# Patient Record
Sex: MALE | Race: WHITE | NOT HISPANIC OR LATINO | Employment: PART TIME | URBAN - METROPOLITAN AREA
[De-identification: names, ages, dates, MRNs, and addresses within clinical notes are randomized per-mention and may not be internally consistent; named-entity substitution may affect disease eponyms.]

---

## 2017-03-30 ENCOUNTER — TELEPHONE (OUTPATIENT)
Dept: TRANSPLANT | Facility: CLINIC | Age: 57
End: 2017-03-30

## 2017-03-30 NOTE — TELEPHONE ENCOUNTER
Patient information called and information that his medical records have been received and will be reviewed for appointment.  Patient instructed that some one will call him to set-up an appointment and he must bring his current imaging to his appointment.

## 2017-03-30 NOTE — TELEPHONE ENCOUNTER
Call placed to the referring doctor's office to inform them that we have received the referral.  Updated labs are now being faxed .

## 2017-04-03 DIAGNOSIS — Z76.82 ORGAN TRANSPLANT CANDIDATE: ICD-10-CM

## 2017-04-03 DIAGNOSIS — C22.0 HEPATOCELLULAR CARCINOMA: ICD-10-CM

## 2017-04-04 ENCOUNTER — TELEPHONE (OUTPATIENT)
Dept: TRANSPLANT | Facility: CLINIC | Age: 57
End: 2017-04-04

## 2017-04-04 RX ORDER — ASCORBIC ACID 500 MG
500 TABLET ORAL DAILY
Status: ON HOLD | COMMUNITY
End: 2017-12-10 | Stop reason: HOSPADM

## 2017-04-04 RX ORDER — NADOLOL 40 MG/1
20 TABLET ORAL DAILY
COMMUNITY
End: 2017-06-27 | Stop reason: SINTOL

## 2017-04-04 RX ORDER — FERROUS SULFATE 325(65) MG
325 TABLET ORAL 3 TIMES DAILY
Status: ON HOLD | COMMUNITY
End: 2017-11-29 | Stop reason: HOSPADM

## 2017-04-04 RX ORDER — POLYETHYLENE GLYCOL 3350 17 G/17G
17 POWDER, FOR SOLUTION ORAL DAILY
Status: ON HOLD | COMMUNITY
End: 2017-11-29 | Stop reason: HOSPADM

## 2017-04-04 RX ORDER — OMEPRAZOLE 20 MG/1
20 CAPSULE, DELAYED RELEASE ORAL DAILY
Status: ON HOLD | COMMUNITY
End: 2017-12-10 | Stop reason: HOSPADM

## 2017-04-04 RX ORDER — IBUPROFEN 200 MG
200 TABLET ORAL DAILY
Status: ON HOLD | COMMUNITY
End: 2017-11-29 | Stop reason: HOSPADM

## 2017-04-06 ENCOUNTER — OFFICE VISIT (OUTPATIENT)
Dept: TRANSPLANT | Facility: CLINIC | Age: 57
End: 2017-04-06
Payer: MEDICAID

## 2017-04-06 ENCOUNTER — LAB VISIT (OUTPATIENT)
Dept: LAB | Facility: HOSPITAL | Age: 57
End: 2017-04-06
Attending: INTERNAL MEDICINE
Payer: MEDICAID

## 2017-04-06 ENCOUNTER — TELEPHONE (OUTPATIENT)
Dept: TRANSPLANT | Facility: CLINIC | Age: 57
End: 2017-04-06

## 2017-04-06 VITALS
OXYGEN SATURATION: 99 % | DIASTOLIC BLOOD PRESSURE: 78 MMHG | BODY MASS INDEX: 27.11 KG/M2 | HEIGHT: 70 IN | SYSTOLIC BLOOD PRESSURE: 141 MMHG | WEIGHT: 189.38 LBS | RESPIRATION RATE: 16 BRPM | TEMPERATURE: 98 F | HEART RATE: 50 BPM

## 2017-04-06 DIAGNOSIS — Z76.82 ORGAN TRANSPLANT CANDIDATE: ICD-10-CM

## 2017-04-06 DIAGNOSIS — R18.8 OTHER ASCITES: Primary | ICD-10-CM

## 2017-04-06 DIAGNOSIS — C22.0 HEPATOCELLULAR CARCINOMA: ICD-10-CM

## 2017-04-06 LAB
ABO + RH BLD: NORMAL
AFP SERPL-MCNC: 30 NG/ML
ALBUMIN SERPL BCP-MCNC: 3.3 G/DL
ALP SERPL-CCNC: 117 U/L
ALT SERPL W/O P-5'-P-CCNC: 126 U/L
AMPHET+METHAMPHET UR QL: NEGATIVE
ANION GAP SERPL CALC-SCNC: 5 MMOL/L
AST SERPL-CCNC: 124 U/L
BARBITURATES UR QL SCN>200 NG/ML: NEGATIVE
BASOPHILS # BLD AUTO: 0.02 K/UL
BASOPHILS NFR BLD: 0.6 %
BENZODIAZ UR QL SCN>200 NG/ML: NEGATIVE
BILIRUB DIRECT SERPL-MCNC: 0.7 MG/DL
BILIRUB SERPL-MCNC: 1.6 MG/DL
BILIRUB UR QL STRIP: NEGATIVE
BLD GP AB SCN CELLS X3 SERPL QL: NORMAL
BUN SERPL-MCNC: 15 MG/DL
BZE UR QL SCN: NEGATIVE
CALCIUM SERPL-MCNC: 9 MG/DL
CANNABINOIDS UR QL SCN: NEGATIVE
CHLORIDE SERPL-SCNC: 109 MMOL/L
CHOLEST/HDLC SERPL: 2.7 {RATIO}
CLARITY UR REFRACT.AUTO: ABNORMAL
CO2 SERPL-SCNC: 29 MMOL/L
COLOR UR AUTO: ABNORMAL
CREAT SERPL-MCNC: 0.9 MG/DL
CREAT UR-MCNC: 119 MG/DL
DIFFERENTIAL METHOD: ABNORMAL
EOSINOPHIL # BLD AUTO: 0.2 K/UL
EOSINOPHIL NFR BLD: 5.6 %
ERYTHROCYTE [DISTWIDTH] IN BLOOD BY AUTOMATED COUNT: 15.4 %
EST. GFR  (AFRICAN AMERICAN): >60 ML/MIN/1.73 M^2
EST. GFR  (NON AFRICAN AMERICAN): >60 ML/MIN/1.73 M^2
ETHANOL UR-MCNC: <10 MG/DL
GGT SERPL-CCNC: 125 U/L
GLUCOSE SERPL-MCNC: 125 MG/DL
GLUCOSE UR QL STRIP: NEGATIVE
HCT VFR BLD AUTO: 36.2 %
HDL/CHOLESTEROL RATIO: 37.4 %
HDLC SERPL-MCNC: 139 MG/DL
HDLC SERPL-MCNC: 52 MG/DL
HGB BLD-MCNC: 12 G/DL
HGB UR QL STRIP: NEGATIVE
INR PPP: 1.1
KETONES UR QL STRIP: NEGATIVE
LDLC SERPL CALC-MCNC: 80 MG/DL
LEUKOCYTE ESTERASE UR QL STRIP: NEGATIVE
LYMPHOCYTES # BLD AUTO: 0.9 K/UL
LYMPHOCYTES NFR BLD: 26 %
MCH RBC QN AUTO: 29.2 PG
MCHC RBC AUTO-ENTMCNC: 33.1 %
MCV RBC AUTO: 88 FL
METHADONE UR QL SCN>300 NG/ML: NEGATIVE
MICROSCOPIC COMMENT: NORMAL
MONOCYTES # BLD AUTO: 0.5 K/UL
MONOCYTES NFR BLD: 13.3 %
NEUTROPHILS # BLD AUTO: 1.9 K/UL
NEUTROPHILS NFR BLD: 54.5 %
NITRITE UR QL STRIP: NEGATIVE
NONHDLC SERPL-MCNC: 87 MG/DL
OPIATES UR QL SCN: NEGATIVE
PCP UR QL SCN>25 NG/ML: NEGATIVE
PH UR STRIP: 6 [PH] (ref 5–8)
PLATELET # BLD AUTO: 110 K/UL
PMV BLD AUTO: 11.1 FL
POTASSIUM SERPL-SCNC: 4 MMOL/L
PROT SERPL-MCNC: 7.5 G/DL
PROT UR QL STRIP: NEGATIVE
PROTHROMBIN TIME: 11.9 SEC
RBC # BLD AUTO: 4.11 M/UL
SODIUM SERPL-SCNC: 143 MMOL/L
SP GR UR STRIP: 1.02 (ref 1–1.03)
TOXICOLOGY INFORMATION: NORMAL
TRIGL SERPL-MCNC: 35 MG/DL
URN SPEC COLLECT METH UR: ABNORMAL
UROBILINOGEN UR STRIP-ACNC: NEGATIVE EU/DL
WBC # BLD AUTO: 3.39 K/UL

## 2017-04-06 PROCEDURE — 99999 PR PBB SHADOW E&M-EST. PATIENT-LVL IV: CPT | Mod: PBBFAC,TXP,, | Performed by: INTERNAL MEDICINE

## 2017-04-06 PROCEDURE — 80307 DRUG TEST PRSMV CHEM ANLYZR: CPT

## 2017-04-06 PROCEDURE — 81001 URINALYSIS AUTO W/SCOPE: CPT | Mod: NTX

## 2017-04-06 PROCEDURE — 99205 OFFICE O/P NEW HI 60 MIN: CPT | Mod: S$PBB,,, | Performed by: INTERNAL MEDICINE

## 2017-04-06 PROCEDURE — 99214 OFFICE O/P EST MOD 30 MIN: CPT | Mod: PBBFAC,NTX | Performed by: INTERNAL MEDICINE

## 2017-04-06 RX ORDER — FUROSEMIDE 20 MG/1
20 TABLET ORAL DAILY
Qty: 30 TABLET | Refills: 11 | Status: ON HOLD | OUTPATIENT
Start: 2017-04-06 | End: 2017-11-29 | Stop reason: HOSPADM

## 2017-04-06 RX ORDER — SPIRONOLACTONE 50 MG/1
50 TABLET, FILM COATED ORAL DAILY
Qty: 30 TABLET | Refills: 11 | Status: ON HOLD | OUTPATIENT
Start: 2017-04-06 | End: 2017-11-29 | Stop reason: HOSPADM

## 2017-04-06 NOTE — LETTER
April 7, 2017        Benji Moyer  [address] [facility]  [address]  [facility]  [place] [address]  Phone: [phone]  Fax: [phone]             [facility]  [address]  [facility] [address]  Phone: [phone]   Patient: Martin Houston   MR Number: [id number]   YOB: 1960   Date of Visit: 4/6/2017       Dear Dr. Benji Moyer    Thank you for referring Martin Houston to me for evaluation. Attached you will find relevant portions of my assessment and plan of care.    If you have questions, please do not hesitate to call me. I look forward to following Martin Houston along with you.    Sincerely,    Sigrid Nolan MD    Enclosure    If you would like to receive this communication electronically, please contact [email or web address] or [phone] to request [facility] access.    [facility] is a tool which provides read-only access to select patient information with whom you have a relationship. Its easy to use and provides real time access to review your patients record including encounter summaries, notes, results, and demographic information.    If you feel you have received this communication in error or would no longer like to receive these types of communications, please e-mail [email or web address]

## 2017-04-06 NOTE — MR AVS SNAPSHOT
[facility]  [address]  [facility] [address]  Phone: [phone]                  Martin Houston   2017 10:00 AM   Office Visit    Description:  Male : 1960   Provider:  Sigrid Nolan MD   Department:  [facility]           Diagnoses this Visit        Comments    Other ascites    -  Primary     Organ transplant candidate         Hepatocellular carcinoma                To Do List           Goals (5 Years of Data)     None       These Medications        Disp Refills Start End    furosemide (LASIX) 20 MG tablet 30 tablet 11 2017    Take 1 tablet (20 mg total) by mouth once daily. - Oral    Pharmacy: [facility] [address] [place] [address] Ph #: [phone]       spironolactone (ALDACTONE) 50 MG tablet 30 tablet 11 2017    Take 1 tablet (50 mg total) by mouth once daily. - Oral    Pharmacy: [facility] [address] [place] [address] Ph #: [phone]         [facility] On Call     [facility] On Call Nurse Care Line -  Assistance  Unless otherwise directed by your provider, please contact [facility], our nurse care line that is available for  assistance.     Registered nurses in the [facility] provide: appointment scheduling, clinical advisement, health education, and other advisory services.  Call: [phone] (toll free)               Medications           Message regarding Medications     Verify the changes and/or additions to your medication regime listed below are the same as discussed with your clinician today.  If any of these changes or additions are incorrect, please notify your healthcare provider.        START taking these NEW medications        Refills    furosemide (LASIX) 20 MG tablet 11    Sig: Take 1 tablet (20 mg total) by mouth once daily.    Class: Normal    Route: Oral    spironolactone (ALDACTONE) 50 MG tablet 11    Sig: Take 1  "tablet (50 mg total) by mouth once daily.    Class: Normal    Route: Oral           Verify that the below list of medications is an accurate representation of the medications you are currently taking.  If none reported, the list may be blank. If incorrect, please contact your healthcare provider. Carry this list with you in case of emergency.           Current Medications     ascorbic acid, vitamin C, (VITAMIN C) 500 MG tablet Take 500 mg by mouth once daily.    ferrous sulfate 325 mg (65 mg iron) Tab tablet Take 325 mg by mouth 2 (two) times daily.    ibuprofen (ADVIL,MOTRIN) 200 MG tablet Take 200 mg by mouth once daily.    multivitamin-zinc gluconate (SOURCECF PED VITAMIN WITH ZINC) 5 mg/mL Drop Take by mouth.    nadolol (CORGARD) 40 MG tablet Take 40 mg by mouth once daily.    omeprazole (PRILOSEC) 20 MG capsule Take 20 mg by mouth once daily.    polyethylene glycol (GLYCOLAX) 17 gram PwPk Take 17 g by mouth once daily.    furosemide (LASIX) 20 MG tablet Take 1 tablet (20 mg total) by mouth once daily.    spironolactone (ALDACTONE) 50 MG tablet Take 1 tablet (50 mg total) by mouth once daily.           Clinical Reference Information           Your Vitals Were     BP Pulse Temp Resp Height Weight    141/78 (BP Location: Right arm, Patient Position: Sitting, BP Method: Automatic) 50 97.7 °F (36.5 °C) (Oral) 16 5' 10" (1.778 m) 85.9 kg (189 lb 6 oz)    SpO2 BMI             99% 27.17 kg/m2         Blood Pressure          Most Recent Value    BP  (!)  141/78      Allergies as of 4/6/2017     No Known Allergies      Immunizations Administered on Date of Encounter - 4/6/2017     None      Orders Placed During Today's Visit      Normal Orders This Visit    Toxicology screen, urine     Urinalysis       Maintenance Dialysis History     Patient has no recorded history of maintenance dialysis.      Transplant Information        Txp Date Organ Coordinator Care Team     Liver Rosangela Funes Referring Physician:  Benji LYNCH" MD [facility]   Corresponding Physician:  Benji Moyer MD         [facility] Sign-Up     Activating your [facility] account is as easy as 1-2-3!     1) Visit [email or web address], select Sign Up Now, enter this activation code and your date of birth, then select Next.  [email or web address]  Expires: 5/21/2017 10:55 AM      2) Create a username and password to use when you visit [facility] in the future and select a security question in case you lose your password and select Next.    3) Enter your e-mail address and click Sign Up!    Additional Information  If you have questions, please e-mail [email or web address] or call [phone] to talk to our [facility] staff. Remember, [facility] is NOT to be used for urgent needs. For medical emergencies, dial 911.         Instructions    You have liver cancer.  We will discuss in IR conference next Tuesday.    We will determine appropriate treatment based on the conference discussion.      We will start fluid pills for management of fluid.      It is important to monitor your salt intake.  I will provide handout.    Return to clinic with surveillance imaging for same day.         Smoking Cessation     If you would like to quit smoking:   You may be eligible for free services if you are a [place] resident and started smoking cigarettes before September 1, 1988.  Call the Smoking Cessation Trust ([facility]) toll free at (543) 520-2694 or (581) [phone].   Call 1-800-QUIT-NOW if you do not meet the above criteria.   Contact us via email: [email or web address]   View our website for more information: [email or web address]        Language Assistance Services     ATTENTION: Language assistance services are available, free of charge. Please call [phone].      ATENCIÓN: Si habla español, tiene a chamorro disposición servicios gratuitos de asistencia lingüística. Llame al [phone].     CHÚ Ý: N?u b?n nói Ti?ng Vi?t, có các d?ch v? h? tr? ngôn ng? mi?n phí dành cho b?n. G?i s?  [phone].         [facility] complies with applicable Federal civil rights laws and does not discriminate on the basis of race, color, national origin, age, disability, or sex.

## 2017-04-06 NOTE — Clinical Note
I tried to send note to patient's PCP Deana Morillo but I cannot find information in google.  Can we locate this information so that she can also receive correspondence?  Thanks

## 2017-04-06 NOTE — PATIENT INSTRUCTIONS
You have liver cancer.  We will discuss in IR conference next Tuesday.    We will determine appropriate treatment based on the conference discussion.      We will start fluid pills for management of fluid.      It is important to monitor your salt intake.  I will provide handout.    Return to clinic with surveillance imaging for same day.

## 2017-04-06 NOTE — PROGRESS NOTES
Transplant Hepatology  Liver Transplant Recipient Evaluation      Consultation started: 4/6/2017 at 10:02 AM     Original Referring Provider: Benji Moyer  Current Corresponding Physician: Benji MENESES Native Liver Diagnosis: Primary Liver Malignancy: Hepatoma (HCC) and Cirrhosis    Reason for Visit: evaluation for liver transplant     Subjective:     Martin Houston is a 56 y.o. 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  The patient is accompanied by his parents and aunt.    The patient reports being diagnosed with liver disease secondary to hepatitis C approximately fifteen years ago while incarcerated.  At that time he does not report systems of CLD but had routine blood work performed.  Over time he did develop variceal bleeding and required banding along with ascites.  He has had intermittent jaundice.  Denies overt encephalopathy and not currently on lactulose or rifaximin.    The patient is treatment naive for hepatitis C.  He also had heavy alcohol use while in the .  Reports drinking from late teens until his 30s.  He was unable to consume alcohol while incarcerated and continued to abstain after his release based on the knowledge of liver disease.      The patient admitted to hospital in 2/2017 for dyspnea that was felt to be a panic attack.  During evaluation for SOB, imaging performed including abdominal ultrasound, which was concerning for liver mass.  Patient underwent further imaging that revealed new liver mass concerning for HCC.  Biopsy performed that is consistent with well-differentiated HCC.  Dominant lesion is reported as 8cm but also 1.6cm lesion present.  The patient is referred for management and consideration of transplant.      PMH:   Alcoholic and hepatitis C cirrhosis  HCC    PSH:  No abdominal surgeries;  Right knee surgery     FH: no family history of liver disease    SH:  No alcohol consumption, continuing to smoke tobacco, no  illicit drug use     Review of Systems   Constitutional: Positive for activity change and fatigue. Negative for appetite change, chills and unexpected weight change.   HENT: Negative for hearing loss and sore throat.    Eyes: Negative for visual disturbance.   Respiratory: Negative for shortness of breath.    Cardiovascular: Positive for leg swelling. Negative for chest pain.   Gastrointestinal: Positive for abdominal pain. Negative for abdominal distention, blood in stool, nausea and vomiting.   Musculoskeletal: Negative for gait problem.   Skin: Negative for rash.   Neurological: Negative for weakness and headaches.   Hematological: Negative for adenopathy. Does not bruise/bleed easily.   Psychiatric/Behavioral: Positive for decreased concentration. Negative for confusion.       Objective:   Physical Exam   Constitutional: He is oriented to person, place, and time. He appears well-developed and well-nourished. No distress.   HENT:   Head: Normocephalic and atraumatic.   Mouth/Throat: Oropharynx is clear and moist. No oropharyngeal exudate.   Eyes: Conjunctivae are normal. Pupils are equal, round, and reactive to light. No scleral icterus.   Neck: Normal range of motion. Neck supple. No thyromegaly present.   Cardiovascular: Normal rate, regular rhythm and normal heart sounds.  Exam reveals no gallop and no friction rub.    No murmur heard.  Pulmonary/Chest: Effort normal and breath sounds normal. No respiratory distress. He has no wheezes. He has no rales.   Abdominal: Soft. Bowel sounds are normal. He exhibits no distension. There is no tenderness. There is no rebound and no guarding.   Musculoskeletal: Normal range of motion. He exhibits edema (mild to moderate).   Lymphadenopathy:     He has no cervical adenopathy.   Neurological: He is alert and oriented to person, place, and time. No cranial nerve deficit.   Skin: Skin is warm and dry. No erythema.   Psychiatric: He has a normal mood and affect. His behavior  is normal.   Vitals reviewed.    MELD-Na score: 9 at 4/6/2017  8:55 AM  MELD score: 9 at 4/6/2017  8:55 AM  Calculated from:  Serum Creatinine: 0.9 mg/dL (Rounded to 1) at 4/6/2017  8:55 AM  Serum Sodium: 143 mmol/L (Rounded to 137) at 4/6/2017  8:55 AM  Total Bilirubin: 1.6 mg/dL at 4/6/2017  8:55 AM  INR(ratio): 1.1 at 4/6/2017  8:55 AM  Age: 56 years     Lab Results   Component Value Date     (H) 04/06/2017    BUN 15 04/06/2017    CREATININE 0.9 04/06/2017    CALCIUM 9.0 04/06/2017     04/06/2017    K 4.0 04/06/2017     04/06/2017    PROT 7.5 04/06/2017    CO2 29 04/06/2017    WBC 3.39 (L) 04/06/2017    RBC 4.11 (L) 04/06/2017    HGB 12.0 (L) 04/06/2017    HCT 36.2 (L) 04/06/2017     (L) 04/06/2017     Lab Results   Component Value Date    CHOL 139 04/06/2017    TRIG 35 04/06/2017    HDL 52 04/06/2017    CHOLHDL 37.4 04/06/2017    TOTALCHOLEST 2.7 04/06/2017    ALBUMIN 3.3 (L) 04/06/2017    BILITOT 1.6 (H) 04/06/2017     (H) 04/06/2017     (H) 04/06/2017    ALKPHOS 117 04/06/2017    LABPROT 11.9 04/06/2017    INR 1.1 04/06/2017       Diagnostics: external imaging reports reviewed   CT a/p w/ w/o contrast: 8cm mass in right lobe with segment 6 1.6cm nodule (2/24/2017)  Chest CT (contrast PE protocol):  No lesions (1/31/17)  Path: well-differentiated HCC 3/14/2017    1. Organ transplant candidate    2. Hepatocellular carcinoma        Transplant Hepatology - Candidacy   Assessment/Plan:     Transplant Candidacy: Martin Houston is a 56 y.o. male with ESLD secondary to alcohol abuse and hepatitis C here for evaluation for possible OLT.  In my opinion, it is unclear if  he is a good candidate for liver transplant.     The patient's reported tumor burden is 8cm and 1.6cm by external imaging.  This is outside of [place] and will not likely make him a transplant candidate if this sizing is confirmed.  He will be discussed next week in IR conference to determine if there is agreement  with sizing.  He is biopsy-proven HCC.  Determination of treatment options will also be discussed during conference.    HCC:  Discussed that patient most likely will require locoregional therapy    Volume overload:  Initiated on furosemide 20mg and spironolactone 50mg daily for mild peripheral edema which has been worsening over last few weeks.  Not adherent to low sodium diet and patient provided with handouts.  Recommend repeat BMP within 2 weeks locally.      Disability:  Patient had questions regarding ability to work.  Currently feels that he would like to continue work as an  but concern for increasing fatigue and abdominal pain that may limit abilities.  Discussed that patient may request disability based on medical conditions through local SS office.  They may be very appropriate if he is determined to have aggressive cancer that is different to control and without curative options.      RTC in approximately 2 months with surveillance imaging after HCC treatment     Sigrid Nolan MD     dad - [phone] Luis Alberto (may call if you can't reach patient)      [facility] Patient Status  Functional Status: 80% - Normal activity with effort: some symptoms of disease  Physical Capacity: No Limitations    Outside Records Request: none

## 2017-04-07 NOTE — TELEPHONE ENCOUNTER
Submitted for review at liver conference 4/11/17    ----- Message from Deana Ledezma LPN sent at 4/6/2017  4:45 PM CDT -----  Please add to the IR conference on 4/11/17.  CD given to PHILL Aguilar to bring to radiology.  Patient needs to be evaluated for treatment options.

## 2017-04-10 ENCOUNTER — CONFERENCE (OUTPATIENT)
Dept: TRANSPLANT | Facility: CLINIC | Age: 57
End: 2017-04-10
Payer: MEDICAID

## 2017-04-10 DIAGNOSIS — C22.0 HEPATOCELLULAR CARCINOMA: Primary | ICD-10-CM

## 2017-04-10 PROCEDURE — 99999 PR PBB SHADOW E&M-EST. PATIENT-LVL I: CPT | Mod: PBBFAC,TXP,,

## 2017-04-11 ENCOUNTER — TELEPHONE (OUTPATIENT)
Dept: TRANSPLANT | Facility: CLINIC | Age: 57
End: 2017-04-11

## 2017-04-11 NOTE — TELEPHONE ENCOUNTER
Call returned.  Patient states the spoke with someone yesterday in reference to obtaining medical records and his previous providers.      ----- Message from Annie Sherman sent at 4/10/2017 12:48 PM CDT -----  Contact: pt   .Ander Houston is returning call, [phone]

## 2017-04-11 NOTE — TELEPHONE ENCOUNTER
Martin Houston  [id number]    Presenting Provider: Sigrdi Nolan    Presenting Radiologist: Cuauhtemoc Jernigan    Hepatologist: Sigrid Nolan    Indication for review: Tumor staging and Treatment recommendations  Blood Type: O  Diagnosis: Hepatitis C  Listing status: Cleared for consult only     Summary:  56 y.o. 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   Additional Information:    Last IR Review: N/A    Labs:  Lab Results for Liver Discussion Latest Ref Rng & Units 4/6/2017   MELD/PELD - 9   Creatinine 0.5 - 1.4 mg/dL 0.9   Total Bilirubin 0.1 - 1.0 mg/dL 1.6(H)   AST 10 - 40 U/L 124(H)   ALT 10 - 44 U/L 126(H)   Alk Phos 55 - 135 U/L 117   INR 0.8 - 1.2 1.1   Platelets 150 - 350 K/uL 110(L)   AFP 0.0 - 8.4 ng/mL 30(H)   Albumin 3.5 - 5.2 g/dL 3.3(L)   Sodium 136 - 145 mmol/L 143         Original Imaging:  CT 2/24/17 (ext study):  reports an 8 cm mass in the right lobe of the liver which demonstrates foci of arterial enhancement . Portions of the mass demonstrate contrast washout. There is also a segment VI 1.6 cm nodule which demonstrates contrast washout but no arterial enhancement.    US 1/31/17 (ext study):  reported a 6.8cm hyperchoic round mass within the right lobe liver.     Current Imaging:    Interventions:  Bx done 3/10/17 reported well differentiated hepatocellular carcinoma.    [place]:  Date:   Number and size of lesions:  Characteristics:   Nexavar:  no    Discussion:  8 cm mass with infiltrative appearance with questionable extension into vs compression of the  HV.  Lots of areas of washout.  Vague areas of arterial enhancement.  Outside gita.  Indeterminate findings in seg 4.    Plan:  IR for Y90.  Not a transplant candidate.      Patient notified of IR review and recommendations.  Understanding and agreement expressed.   IR consult requested.  Yttrium orders entered.        Note forwarded to  Hepatology clinical staff to coordinate follow-up

## 2017-04-12 ENCOUNTER — TELEPHONE (OUTPATIENT)
Dept: HEPATOLOGY | Facility: CLINIC | Age: 57
End: 2017-04-12

## 2017-04-12 LAB — PHOSPHATIDYLETHANOL (PETH): NEGATIVE NG/ML

## 2017-04-12 NOTE — TELEPHONE ENCOUNTER
Called patient to discuss IR conference results.  Given size and infiltrating appearance of lesion, recommendation for yttrium.  He is not a transplant candidate based on size outside of [place] and [facility] criteria.  I will plan to see him back in Hepatology clinic in approximately 6 weeks which should be scheduled for same day as surveillance imaging following Y-90 treatment.

## 2017-04-17 ENCOUNTER — INITIAL CONSULT (OUTPATIENT)
Dept: INTERVENTIONAL RADIOLOGY/VASCULAR | Facility: CLINIC | Age: 57
End: 2017-04-17
Payer: MEDICAID

## 2017-04-17 VITALS
SYSTOLIC BLOOD PRESSURE: 136 MMHG | WEIGHT: 186 LBS | HEIGHT: 70 IN | HEART RATE: 52 BPM | DIASTOLIC BLOOD PRESSURE: 67 MMHG | BODY MASS INDEX: 26.63 KG/M2

## 2017-04-17 DIAGNOSIS — C22.0 HCC (HEPATOCELLULAR CARCINOMA): Primary | ICD-10-CM

## 2017-04-17 DIAGNOSIS — C22.0 HEPATOCELLULAR CARCINOMA: Primary | ICD-10-CM

## 2017-04-17 PROCEDURE — 99204 OFFICE O/P NEW MOD 45 MIN: CPT | Mod: S$PBB,,, | Performed by: FAMILY MEDICINE

## 2017-04-17 PROCEDURE — 99999 PR PBB SHADOW E&M-EST. PATIENT-LVL III: CPT | Mod: PBBFAC,,,

## 2017-04-17 PROCEDURE — 99213 OFFICE O/P EST LOW 20 MIN: CPT | Mod: PBBFAC

## 2017-04-17 NOTE — PROGRESS NOTES
"Subjective:       Patient ID: Martin Houston is a 56 y.o. male.    Chief Complaint: Cancer    HPI Comments: Patient here to discuss treatment of his hepatocellular carcinoma recently identified in 2/2017 during a work up for shortness of breath. He c/o occasional abdominal pain and points to his lower abdomen when describing his pain. He tells me the pain resolves on its own. He also complains of fatigue and difficulty sleeping. He tells me his appetite has decreased "I lose the taste of it." He is accompanied by his family.    Review of Systems   Constitutional: Positive for activity change, appetite change (x few months ), chills and fatigue. Negative for fever.   HENT: Negative for congestion, drooling, ear discharge, rhinorrhea, sneezing and trouble swallowing.    Eyes: Negative for pain, discharge, redness and itching.        Wears glasses   Respiratory: Positive for shortness of breath (occasional with exertion). Negative for cough, wheezing and stridor.    Cardiovascular: Negative for palpitations and leg swelling.   Gastrointestinal: Positive for abdominal pain, constipation (occasional) and diarrhea (occasional). Negative for abdominal distention, nausea and vomiting.   Genitourinary: Positive for frequency (due to fluid pills). Negative for difficulty urinating, dysuria and urgency.   Musculoskeletal: Positive for myalgias (bilateral in lower extremities). Negative for arthralgias, back pain, gait problem and joint swelling.   Skin: Negative for color change, pallor and rash.   Neurological: Negative for dizziness, weakness and headaches.   Psychiatric/Behavioral: Positive for sleep disturbance (difficulty sleeping).       Objective:      Physical Exam   Constitutional: He is oriented to person, place, and time. He appears well-developed and well-nourished. No distress.   HENT:   Head: Normocephalic and atraumatic.   Right Ear: External ear normal.   Left Ear: External ear normal.   Nose: Nose normal. "   Mouth/Throat: Oropharynx is clear and moist. No oropharyngeal exudate.   Eyes: Conjunctivae are normal. Pupils are equal, round, and reactive to light. Right eye exhibits no discharge. Left eye exhibits no discharge. No scleral icterus.   Neck: Neck supple. No tracheal deviation present. No thyromegaly present.   Cardiovascular: Normal rate, regular rhythm, normal heart sounds and intact distal pulses.  Exam reveals no gallop and no friction rub.    No murmur heard.  Pulmonary/Chest: Effort normal and breath sounds normal. No stridor. No respiratory distress. He has no wheezes. He has no rales.   Abdominal: Soft. Bowel sounds are normal. He exhibits no distension and no mass. There is no hepatosplenomegaly. There is no tenderness. There is no rebound and no guarding.   Musculoskeletal: He exhibits no edema.   Lymphadenopathy:     He has no cervical adenopathy.   Neurological: He is alert and oriented to person, place, and time. Gait normal.   Skin: Skin is warm and dry. He is not diaphoretic. No cyanosis. Nails show no clubbing.   Psychiatric: He has a normal mood and affect.   Vitals reviewed.      Assessment:       1. HCC (hepatocellular carcinoma)        Plan:         Yttrium 90 Radioembolization discussed in detail with the patient including risks, benefits, potential complications, usual pre and post procedure course.  Discussed the need for initial Angiogram mapping study prior to scheduling the actual Radioembolization procedure. Patient and family verbalized understanding and agreement. Consents signed. Patient scheduled for Y90 mapping on 4/26/2017. Pre-procedure handout with clinic phone number provided.

## 2017-04-21 ENCOUNTER — TELEPHONE (OUTPATIENT)
Dept: HEPATOLOGY | Facility: CLINIC | Age: 57
End: 2017-04-21

## 2017-04-25 DIAGNOSIS — C22.0 HEPATOCELLULAR CARCINOMA: Primary | ICD-10-CM

## 2017-04-26 ENCOUNTER — HOSPITAL ENCOUNTER (OUTPATIENT)
Dept: RADIOLOGY | Facility: HOSPITAL | Age: 57
Discharge: HOME | End: 2017-04-26
Attending: INTERNAL MEDICINE | Admitting: INTERNAL MEDICINE
Payer: MEDICAID

## 2017-04-26 ENCOUNTER — HOSPITAL ENCOUNTER (OUTPATIENT)
Facility: HOSPITAL | Age: 57
Discharge: HOME | End: 2017-04-26
Attending: INTERNAL MEDICINE | Admitting: INTERNAL MEDICINE
Payer: MEDICAID

## 2017-04-26 VITALS
DIASTOLIC BLOOD PRESSURE: 53 MMHG | HEART RATE: 43 BPM | WEIGHT: 182 LBS | TEMPERATURE: 98 F | RESPIRATION RATE: 16 BRPM | HEIGHT: 70 IN | SYSTOLIC BLOOD PRESSURE: 109 MMHG | OXYGEN SATURATION: 100 % | BODY MASS INDEX: 26.05 KG/M2

## 2017-04-26 DIAGNOSIS — C22.0 HEPATOCELLULAR CARCINOMA: ICD-10-CM

## 2017-04-26 DIAGNOSIS — C22.0 HCC (HEPATOCELLULAR CARCINOMA): ICD-10-CM

## 2017-04-26 LAB
ALBUMIN SERPL BCP-MCNC: 3.4 G/DL
ALP SERPL-CCNC: 119 U/L
ALT SERPL W/O P-5'-P-CCNC: 180 U/L
ANION GAP SERPL CALC-SCNC: 6 MMOL/L
AST SERPL-CCNC: 164 U/L
BASOPHILS # BLD AUTO: 0.05 K/UL
BASOPHILS NFR BLD: 1 %
BILIRUB DIRECT SERPL-MCNC: 1 MG/DL
BILIRUB SERPL-MCNC: 2.4 MG/DL
BUN SERPL-MCNC: 16 MG/DL
CALCIUM SERPL-MCNC: 9 MG/DL
CHLORIDE SERPL-SCNC: 105 MMOL/L
CO2 SERPL-SCNC: 27 MMOL/L
CREAT SERPL-MCNC: 0.9 MG/DL
DIFFERENTIAL METHOD: ABNORMAL
EOSINOPHIL # BLD AUTO: 0.3 K/UL
EOSINOPHIL NFR BLD: 6.4 %
ERYTHROCYTE [DISTWIDTH] IN BLOOD BY AUTOMATED COUNT: 15.1 %
EST. GFR  (AFRICAN AMERICAN): >60 ML/MIN/1.73 M^2
EST. GFR  (NON AFRICAN AMERICAN): >60 ML/MIN/1.73 M^2
GLUCOSE SERPL-MCNC: 91 MG/DL
HCT VFR BLD AUTO: 40.8 %
HGB BLD-MCNC: 13.3 G/DL
INR PPP: 1.1
LYMPHOCYTES # BLD AUTO: 1.5 K/UL
LYMPHOCYTES NFR BLD: 29.7 %
MCH RBC QN AUTO: 29.4 PG
MCHC RBC AUTO-ENTMCNC: 32.6 %
MCV RBC AUTO: 90 FL
MONOCYTES # BLD AUTO: 0.6 K/UL
MONOCYTES NFR BLD: 11.6 %
NEUTROPHILS # BLD AUTO: 2.6 K/UL
NEUTROPHILS NFR BLD: 51.1 %
PLATELET # BLD AUTO: 122 K/UL
PMV BLD AUTO: 12.1 FL
POTASSIUM SERPL-SCNC: 3.8 MMOL/L
PROT SERPL-MCNC: 7.9 G/DL
PROTHROMBIN TIME: 12 SEC
RBC # BLD AUTO: 4.52 M/UL
SODIUM SERPL-SCNC: 138 MMOL/L
WBC # BLD AUTO: 5.02 K/UL

## 2017-04-26 PROCEDURE — 82248 BILIRUBIN DIRECT: CPT

## 2017-04-26 PROCEDURE — 80053 COMPREHEN METABOLIC PANEL: CPT

## 2017-04-26 PROCEDURE — 63600175 PHARM REV CODE 636 W HCPCS: Performed by: RADIOLOGY

## 2017-04-26 PROCEDURE — 85610 PROTHROMBIN TIME: CPT

## 2017-04-26 PROCEDURE — 85025 COMPLETE CBC W/AUTO DIFF WBC: CPT

## 2017-04-26 PROCEDURE — 25000003 PHARM REV CODE 250: Performed by: NURSE PRACTITIONER

## 2017-04-26 PROCEDURE — 36415 COLL VENOUS BLD VENIPUNCTURE: CPT

## 2017-04-26 PROCEDURE — 78201 LIVER IMAGING STATIC ONLY: CPT | Mod: 26,,, | Performed by: NUCLEAR MEDICINE

## 2017-04-26 PROCEDURE — 25500020 PHARM REV CODE 255: Performed by: INTERNAL MEDICINE

## 2017-04-26 PROCEDURE — 78201 LIVER IMAGING STATIC ONLY: CPT | Mod: TC

## 2017-04-26 RX ORDER — FENTANYL CITRATE 50 UG/ML
INJECTION, SOLUTION INTRAMUSCULAR; INTRAVENOUS CODE/TRAUMA/SEDATION MEDICATION
Status: COMPLETED | OUTPATIENT
Start: 2017-04-26 | End: 2017-04-26

## 2017-04-26 RX ORDER — LIDOCAINE HYDROCHLORIDE 10 MG/ML
1 INJECTION, SOLUTION EPIDURAL; INFILTRATION; INTRACAUDAL; PERINEURAL ONCE AS NEEDED
Status: DISCONTINUED | OUTPATIENT
Start: 2017-04-26 | End: 2017-04-26 | Stop reason: HOSPADM

## 2017-04-26 RX ORDER — MIDAZOLAM HYDROCHLORIDE 1 MG/ML
INJECTION, SOLUTION INTRAMUSCULAR; INTRAVENOUS CODE/TRAUMA/SEDATION MEDICATION
Status: COMPLETED | OUTPATIENT
Start: 2017-04-26 | End: 2017-04-26

## 2017-04-26 RX ORDER — ONDANSETRON 4 MG/1
4 TABLET, FILM COATED ORAL EVERY 6 HOURS PRN
Status: DISCONTINUED | OUTPATIENT
Start: 2017-04-26 | End: 2017-04-26 | Stop reason: HOSPADM

## 2017-04-26 RX ORDER — SODIUM CHLORIDE 9 MG/ML
INJECTION, SOLUTION INTRAVENOUS CONTINUOUS
Status: DISCONTINUED | OUTPATIENT
Start: 2017-04-26 | End: 2017-04-26 | Stop reason: HOSPADM

## 2017-04-26 RX ORDER — OXYCODONE HYDROCHLORIDE 5 MG/1
5 TABLET ORAL EVERY 4 HOURS PRN
Status: DISCONTINUED | OUTPATIENT
Start: 2017-04-26 | End: 2017-04-26 | Stop reason: HOSPADM

## 2017-04-26 RX ADMIN — FENTANYL CITRATE 25 MCG: 50 INJECTION, SOLUTION INTRAMUSCULAR; INTRAVENOUS at 08:04

## 2017-04-26 RX ADMIN — MIDAZOLAM HYDROCHLORIDE 0.5 MG: 1 INJECTION, SOLUTION INTRAMUSCULAR; INTRAVENOUS at 09:04

## 2017-04-26 RX ADMIN — MIDAZOLAM HYDROCHLORIDE 0.5 MG: 1 INJECTION, SOLUTION INTRAMUSCULAR; INTRAVENOUS at 08:04

## 2017-04-26 RX ADMIN — FENTANYL CITRATE 25 MCG: 50 INJECTION, SOLUTION INTRAMUSCULAR; INTRAVENOUS at 09:04

## 2017-04-26 RX ADMIN — AMPICILLIN SODIUM AND SULBACTAM SODIUM 3 G: 2; 1 INJECTION, POWDER, FOR SOLUTION INTRAMUSCULAR; INTRAVENOUS at 06:04

## 2017-04-26 RX ADMIN — IOHEXOL 80 ML: 300 INJECTION, SOLUTION INTRAVENOUS at 09:04

## 2017-04-26 NOTE — PROGRESS NOTES
Patient given discharge instructions and verbalized understanding of at home care. IV discontinued with catheter intact. Bandage to right groin clean, dry and intact. Patient discharged home via wheelchair under care of transport and family members.

## 2017-04-26 NOTE — PROGRESS NOTES
HOB elevated 45 degrees. No bleeding or hematoma noted to right groin site. Will continue to monitor.

## 2017-04-26 NOTE — DISCHARGE SUMMARY
Radiology Discharge Summary      Hospital Course: No complications    Admit Date: 4/26/2017  Discharge Date: 04/26/2017     Instructions Given to Patient: Yes  Diet: Resume prior diet  Activity: activity as tolerated and no driving for today    Description of Condition on Discharge: Stable  Vital Signs (Most Recent): Temp: 97.6 °F (36.4 °C) (04/26/17 0945)  Pulse: (!) 43 (04/26/17 1030)  Resp: 16 (04/26/17 1030)  BP: (!) 107/56 (04/26/17 1030)  SpO2: 100 % (04/26/17 1030)    Discharge Disposition: Home    Discharge Diagnosis: HCC    Michael Magallanes M.D.  Diagnostic and Interventional Radiologist  Department of Radiology  Pager: [phone]

## 2017-04-26 NOTE — SEDATION DOCUMENTATION
Hemostasis achieved via R groin with use of ExoSeal closure device. Patient to lie flat till 11:35.

## 2017-04-26 NOTE — H&P
Radiology History & Physical      SUBJECTIVE:     Chief Complaint: HCC in need of treatment    History of Present Illness:  Martin Houston is a 56 y.o. male who presents for Pre- Y90 mapping  Past Medical History:   Diagnosis Date    Anemia     Bone spur of foot     patient bone spurs removed    Cirrhosis     Gallstones     GERD (gastroesophageal reflux disease)     Hernia     patient reports 2 herina in the groin area    Personal history of kidney stones      Past Surgical History:   Procedure Laterality Date    TONSILLECTOMY         Home Meds:   Prior to Admission medications    Medication Sig Start Date End Date Taking? Authorizing Provider   ascorbic acid, vitamin C, (VITAMIN C) 500 MG tablet Take 500 mg by mouth once daily.    Historical Provider, MD   ferrous sulfate 325 mg (65 mg iron) Tab tablet Take 325 mg by mouth 2 (two) times daily.    Historical Provider, MD   furosemide (LASIX) 20 MG tablet Take 1 tablet (20 mg total) by mouth once daily. 4/6/17 4/6/18  Sigrid Nolan MD   ibuprofen (ADVIL,MOTRIN) 200 MG tablet Take 200 mg by mouth once daily.    Historical Provider, MD   multivitamin-zinc gluconate (SOURCECF PED VITAMIN WITH ZINC) 5 mg/mL Drop Take by mouth.    Historical Provider, MD   nadolol (CORGARD) 40 MG tablet Take 40 mg by mouth once daily.    Historical Provider, MD   omeprazole (PRILOSEC) 20 MG capsule Take 20 mg by mouth once daily.    Historical Provider, MD   polyethylene glycol (GLYCOLAX) 17 gram PwPk Take 17 g by mouth once daily.    Historical Provider, MD   spironolactone (ALDACTONE) 50 MG tablet Take 1 tablet (50 mg total) by mouth once daily. 4/6/17 4/6/18  Sigrid Nolan MD     Anticoagulants/Antiplatelets: no anticoagulation    Allergies: Review of patient's allergies indicates:  No Known Allergies  Sedation History:  no adverse reactions    Review of Systems:   Hematological: no known coagulopathies  Respiratory: no shortness of breath  Cardiovascular: no chest  pain  Gastrointestinal: no abdominal pain  Genito-Urinary: no dysuria  Musculoskeletal: negative  Neurological: no TIA or stroke symptoms         OBJECTIVE:     Vital Signs (Most Recent)       Physical Exam:  ASA: 2  Mallampati: 3    General: no acute distress  Mental Status: alert and oriented to person, place and time  HEENT: normocephalic, atraumatic  Chest: unlabored breathing  Heart: regular heart rate  Abdomen: nondistended  Extremity: moves all extremities    Laboratory  Lab Results   Component Value Date    INR 1.1 04/06/2017       Lab Results   Component Value Date    WBC 3.39 (L) 04/06/2017    HGB 12.0 (L) 04/06/2017    HCT 36.2 (L) 04/06/2017    MCV 88 04/06/2017     (L) 04/06/2017      Lab Results   Component Value Date     (H) 04/06/2017     04/06/2017    K 4.0 04/06/2017     04/06/2017    CO2 29 04/06/2017    BUN 15 04/06/2017    CREATININE 0.9 04/06/2017    CALCIUM 9.0 04/06/2017     (H) 04/06/2017     (H) 04/06/2017    ALBUMIN 3.3 (L) 04/06/2017    BILITOT 1.6 (H) 04/06/2017    BILIDIR 0.7 (H) 04/06/2017       ASSESSMENT/PLAN:     Sedation Plan: moderate  Patient will undergo pre Y90 mapping and flow diversion of any branches feeding bowel or stomach as possible/necessary.    Jeremy Duran MD  Radiology

## 2017-04-26 NOTE — SEDATION DOCUMENTATION
Patient transferred to exam table in supine position. Patient placed on continuous cardiac and CO2 monitor, automatic blood pressure and pulse ox. Patient AAO.

## 2017-04-26 NOTE — IP AVS SNAPSHOT
[facility]  [address]  [facility] [address]  Phone: [phone]           Patient Discharge Instructions   Our goal is to set you up for success. This packet includes information on your condition, medications, and your home care.  It will help you care for yourself to prevent having to return to the hospital.     Please ask your nurse if you have any questions.      There are many details to remember when preparing to leave the hospital. Here is what you will need to do:    1. Take your medicine. If you are prescribed medications, review your Medication List on the following pages. You may have new medications to  at the pharmacy and others that you'll need to stop taking. Review the instructions for how and when to take your medications. Talk with your doctor or nurses if you are unsure of what to do.     2. Go to your follow-up appointments. Specific follow-up information is listed in the following pages. Your may be contacted by a nurse or clinical provider about future appointments. Be sure we have all of the phone numbers to reach you. Please contact your provider's office if you are unable to make an appointment.     3. Watch for warning signs. Your doctor or nurse will give you detailed warning signs to watch for and when to call for assistance. These instructions may also include educational information about your condition. If you experience any of warning signs to your health, call your doctor.           [facility]  Unless otherwise directed by your provider, please   contact [facility], our nurse care line   that is available for 24/7 assistance.     [phone] (toll-free)     Registered nurses in the [facility]   provide: appointment scheduling, clinical advisement, health education, and other advisory services.                  ** Verify the list of medication(s) below is accurate and up to date. Carry this with you in case of  emergency. If your medications have changed, please notify your healthcare provider.             Medication List      ASK your doctor about these medications        Additional Info                      ferrous sulfate 325 mg (65 mg iron) Tab tablet   Refills:  0   Dose:  325 mg    Instructions:  Take 325 mg by mouth 2 (two) times daily.     Begin Date    AM    Noon    PM    Bedtime       furosemide 20 MG tablet   Commonly known as:  LASIX   Quantity:  30 tablet   Refills:  11   Dose:  20 mg    Instructions:  Take 1 tablet (20 mg total) by mouth once daily.     Begin Date    AM    Noon    PM    Bedtime       ibuprofen 200 MG tablet   Commonly known as:  ADVIL,MOTRIN   Refills:  0   Dose:  200 mg    Instructions:  Take 200 mg by mouth once daily.     Begin Date    AM    Noon    PM    Bedtime       nadolol 40 MG tablet   Commonly known as:  CORGARD   Refills:  0   Dose:  40 mg    Instructions:  Take 40 mg by mouth once daily.     Begin Date    AM    Noon    PM    Bedtime       omeprazole 20 MG capsule   Commonly known as:  PRILOSEC   Refills:  0   Dose:  20 mg    Instructions:  Take 20 mg by mouth once daily.     Begin Date    AM    Noon    PM    Bedtime       polyethylene glycol 17 gram Pwpk   Commonly known as:  GLYCOLAX   Refills:  0   Dose:  17 g    Instructions:  Take 17 g by mouth once daily.     Begin Date    AM    Noon    PM    Bedtime       SOURCECF PED VITAMIN WITH ZINC 5 mg/mL Drop   Refills:  0   Generic drug:  multivitamin-zinc gluconate    Instructions:  Take by mouth.     Begin Date    AM    Noon    PM    Bedtime       spironolactone 50 MG tablet   Commonly known as:  ALDACTONE   Quantity:  30 tablet   Refills:  11   Dose:  50 mg    Instructions:  Take 1 tablet (50 mg total) by mouth once daily.     Begin Date    AM    Noon    PM    Bedtime       VITAMIN C 500 MG tablet   Refills:  0   Dose:  500 mg   Generic drug:  ascorbic acid (vitamin C)    Instructions:  Take 500 mg by mouth once daily.     Begin  "Date    AM    Noon    PM    Bedtime                  Please bring to all follow up appointments:    1. A copy of your discharge instructions.  2. All medicines you are currently taking in their original bottles.  3. Identification and insurance card.    Please arrive 15 minutes ahead of scheduled appointment time.    Please call 24 hours in advance if you must reschedule your appointment and/or time.            Discharge Instructions       For scheduling: Call Malissa at [phone]    For questions or concerns call: [facility] 8 AM- 5PM [phone]. Radiology resident on call [phone].    For immediate concerns that are not emergent, you may call our radiology clinic at: [phone]    Discharge References/Attachments     SEDATION, PROCEDURAL (ADULT) (ENGLISH)    HEPATIC ANGIOGRAPHY, DISCHARGE INSTRUCTIONS (ENGLISH)        Admission Information     Date & Time Provider Department CSN    4/26/2017  5:45 AM Jorge Alfredo MD [facility] [id number]      Care Providers     Provider Role Specialty Primary office phone    Joreg Alfredo MD Attending Provider Nephrology [phone]      Your Vitals Were     BP Pulse Temp Resp Height Weight    107/56 43 97.6 °F (36.4 °C) (Oral) 16 5' 10" (1.778 m) 82.6 kg (182 lb)    SpO2 BMI             100% 26.11 kg/m2         Recent Lab Values     No lab values to display.      Allergies as of 4/26/2017     No Known Allergies      Advance Directives     An advance directive is a document which, in the event you are no longer able to make decisions for yourself, tells your healthcare team what kind of treatment you do or do not want to receive, or who you would like to make those decisions for you.  If you do not currently have an advance directive, [facility] encourages you to create one.  For more information call:  [phone]), [phone] ([phone]),  or log on to [email or web address].        Smoking Cessation     If you would " like to quit smoking:   You may be eligible for free services if you are a [place] resident and started smoking cigarettes before September 1, 1988.  Call the Smoking Cessation Trust (SCT) toll free at (059) 486-5894 or (240) 806-0919.   Call 1-800-QUIT-NOW if you do not meet the above criteria.   Contact us via email: [email or web address]   View our website for more information: [email or web address]        Language Assistance Services     ATTENTION: Language assistance services are available, free of charge. Please call [phone].      ATENCIÓN: Si habla español, tiene a chamorro disposición servicios gratuitos de asistencia lingüística. Llame al [phone].     CHÚ Ý: N?u b?n nói Ti?ng Vi?t, có các d?ch v? h? tr? ngôn ng? mi?n phí dành cho b?n. G?i s? [phone].        [facility] Sign-Up     Activating your [facility] account is as easy as 1-2-3!     1) Visit [email or web address], select Sign Up Now, enter this activation code and your date of birth, then select Next.  [email or web address]  Expires: 5/21/2017 10:55 AM      2) Create a username and password to use when you visit [facility] in the future and select a security question in case you lose your password and select Next.    3) Enter your e-mail address and click Sign Up!    Additional Information  If you have questions, please e-mail [email or web address] or call [phone] to talk to our [facility] staff. Remember, [facility] is NOT to be used for urgent needs. For medical emergencies, dial 911.          [facility] complies with applicable Federal civil rights laws and does not discriminate on the basis of race, color, national origin, age, disability, or sex.

## 2017-04-26 NOTE — PROCEDURES
"Radiology Post-Procedure Note    Pre Op Diagnosis: Hepatocellular carcinoma    Post Op Diagnosis: Hepatocellular carcinoma    Procedure: Pre- yttrium angiogram evaluation    Procedure Performed by: Sanford Magallanes MD, MD Richard    Written Informed Consent Obtained: Yes    Specimen Removed: None    Estimated Blood Loss: Minimal    Findings:     After placement of a right femoral artery sheath, a 5-Georgian catheter was inserted and angiography of the celiac artery and superior mesenteric artery for anatomic evaluation and localization of hepatic tumor.  A microcatheter was introduced into the right , middle and left hepatic arteries, and angiogram images were obtained. The right hepatic artery was then accessed and 5 mCu of MAA was introduced into the artery.  Post procedural angiography revealed no complications.    Right femoral artery angiogram was performed and the sheath was removed.  Hemostasis was achieved using an exoseal.  There was no hematoma at the time of hemostasis.    The patient tolerated procedure well.  Please see Imaging report for further details.    Cuauhtemoc Gasca MD (Buck)  Radiology PGY-3  [phone]    "

## 2017-04-26 NOTE — PROGRESS NOTES
After Visit Summary   1/9/2024    Ariel Hunter   MRN: [id number]           Visit Information     Date & Time  1/9/2024  4:00 PM Provider  WILLOW FLORES 1 Department  [facility] - Infusion Dept. Phone  [phone]      Your Vitals Were  Most recent update: 1/9/2024  3:55 PM    BP   100/67 (BP Location: Left arm, Patient Position: Sitting, Cuff Size: adult)          Pulse   100          Temp   98.1 °F (36.7 °C) (Oral)          Resp   16          Wt   70.5 kg (155 lb 8 oz)             SpO2   100%    BMI   24.35 kg/m²         Allergies as of 1/9/2024  Review status set to Review Complete on 1/8/2024       Noted Reaction Type Reactions    Infed [iron Dextran] 02/04/2021    HIVES    Ciprofloxacin 11/27/2016        Other reaction(s): burning urination      Your Current Medications        Dosage    fluocinonide 0.05 % External Ointment Use twice daily  to affected areas in scalp with flares    ketoconazole 2 % External Shampoo Use 2-3 times weekly. Lather into scalp, beard, and face and leave on for 5 minutes before washing off.    hydrocortisone 2.5 % External Cream Apply every morning and evening with flares of face rash    Dulaglutide (TRULICITY) 1.5 MG/0.5ML Subcutaneous Solution Pen-injector Inject 1.5 mg into the skin once a week.    insulin lispro (HUMALOG) 100 UNIT/ML Injection Solution Inject via insulin pump. Max daily dose 80 units.    OneTouch Delica Lancets 33G Does not apply Misc Use to check blood sugar 3x daily    ONETOUCH ULTRA In Vitro Strip Check blood sugar up to 3x daily    Insulin Disposable Pump (OMNIPOD 5 G6 POD, GEN 5,) Does not apply Misc 1 each every other day.    Continuous Blood Gluc Transmit (DEXCOM G6 TRANSMITTER) Does not apply Misc TEST BLOOD GLUCOSE AS DIRECTED WITH DEXCOM G6 SENSOR    Continuous Blood Gluc Sensor (DEXCOM G6 SENSOR) Does not apply Misc Change sensor every 10 days    DEXCOM G6  Does not apply Device 1 Device daily.    Insulin  Pt arrive to the IR Dept for Pre-Yttrium procedure. Pre - Op  Assessment in progress. Awaiting consent and lab results.    Disposable Pump (OMNIPOD 5 G6 INTRO, GEN 5,) Does not apply Kit 1 each As Directed.    predniSONE 20 MG Oral Tab Take 2 tablets (40 mg total) by mouth daily.    Insulin Pen Needle (TRUEPLUS 5-BEVEL PEN NEEDLES) 32G X 4 MM Does not apply Misc USE TO INJECT INSULIN UP TO THREE TIMES DAILY    montelukast 10 MG Oral Tab Take 1 tablet (10 mg total) by mouth every morning.    Vitamin D3, Cholecalciferol, (VITAMIN D3) 125 MCG (5000 UT) Oral Cap Take 1 capsule (5,000 Units total) by mouth daily.    STELARA 90 MG/ML Subcutaneous Solution Prefilled Syringe injection Inject into the skin every 3 (three) months. Takes every 2 months subcut    metFORMIN HCl 1000 MG Oral Tab Take 1 tablet (1,000 mg total) by mouth 2 (two) times daily.      Diagnoses for This Visit    Iron deficiency anemia secondary to inadequate dietary iron intake   [280.1.ICD-9-CM]  -  Primary  Other specified intestinal malabsorption   [579.8.ICD-9-CM]    Iron deficiency anemia due to chronic blood loss   [664667]    B12 deficiency   [665662]             Future Appointments        Provider Department    2/28/2024 4:00 PM EM CC INFRN 1 [facility] - Infusion    3/7/2024 1:45 PM Vic Moore [facility]    3/8/2024 1:00 PM [facility] Hematology Oncology    3/8/2024 1:30 PM Ketan Caputo [facility] Hematology Oncology    7/8/2024 2:00 PM Corbin Schwartz [facility] [address], [place]                Did you know that [facility] primary care physicians now offer Video Visits through [employer] for adult patients for a variety of conditions such as allergies, back pain and cold symptoms? Skip the drive and waiting room and online chat with a doctor face-to-face using your web-cam enabled computer or mobile device wherever you are. Video Visits cost $50 and can be paid hassle-free using a credit, debit, or health savings card.  Not active on [employer]? Ask us how  to get signed up today!          If you receive a survey from [employer], please take a few minutes to complete it and provide feedback. We strive to deliver the best patient experience and are looking for ways to make improvements. Your feedback will help us do so. For more information on [employer], please visit [email or web address]           No text in SmartText           No text in SmartText

## 2017-04-26 NOTE — DISCHARGE INSTRUCTIONS
For scheduling: Call Malissa at [phone]    For questions or concerns call: [facility] 8 AM- 5PM [phone]. Radiology resident on call [phone].    For immediate concerns that are not emergent, you may call our radiology clinic at: [phone]

## 2017-05-12 DIAGNOSIS — C22.0 HEPATOCELLULAR CARCINOMA: Primary | ICD-10-CM

## 2017-05-19 DIAGNOSIS — C22.0 HEPATOCELLULAR CARCINOMA: Primary | ICD-10-CM

## 2017-05-22 ENCOUNTER — HOSPITAL ENCOUNTER (OUTPATIENT)
Dept: RADIOLOGY | Facility: HOSPITAL | Age: 57
Discharge: HOME | End: 2017-05-22
Attending: INTERNAL MEDICINE | Admitting: INTERNAL MEDICINE
Payer: MEDICAID

## 2017-05-22 ENCOUNTER — TELEPHONE (OUTPATIENT)
Dept: HEPATOLOGY | Facility: CLINIC | Age: 57
End: 2017-05-22

## 2017-05-22 ENCOUNTER — HOSPITAL ENCOUNTER (OUTPATIENT)
Facility: HOSPITAL | Age: 57
Discharge: HOME | End: 2017-05-22
Attending: INTERNAL MEDICINE | Admitting: INTERNAL MEDICINE
Payer: MEDICAID

## 2017-05-22 VITALS
HEART RATE: 49 BPM | BODY MASS INDEX: 26.05 KG/M2 | WEIGHT: 182 LBS | OXYGEN SATURATION: 100 % | TEMPERATURE: 98 F | DIASTOLIC BLOOD PRESSURE: 54 MMHG | SYSTOLIC BLOOD PRESSURE: 115 MMHG | RESPIRATION RATE: 16 BRPM | HEIGHT: 70 IN

## 2017-05-22 DIAGNOSIS — C22.0 HEPATOCELLULAR CARCINOMA: ICD-10-CM

## 2017-05-22 DIAGNOSIS — C22.0 HCC (HEPATOCELLULAR CARCINOMA): ICD-10-CM

## 2017-05-22 PROCEDURE — 78201 LIVER IMAGING STATIC ONLY: CPT | Mod: TC

## 2017-05-22 PROCEDURE — 99153 MOD SED SAME PHYS/QHP EA: CPT

## 2017-05-22 PROCEDURE — 25500020 PHARM REV CODE 255: Performed by: INTERNAL MEDICINE

## 2017-05-22 PROCEDURE — 63600175 PHARM REV CODE 636 W HCPCS: Performed by: RADIOLOGY

## 2017-05-22 PROCEDURE — 99152 MOD SED SAME PHYS/QHP 5/>YRS: CPT

## 2017-05-22 PROCEDURE — 78201 LIVER IMAGING STATIC ONLY: CPT | Mod: 26,,, | Performed by: RADIOLOGY

## 2017-05-22 PROCEDURE — 63600175 PHARM REV CODE 636 W HCPCS: Performed by: FAMILY MEDICINE

## 2017-05-22 PROCEDURE — 25000003 PHARM REV CODE 250: Performed by: FAMILY MEDICINE

## 2017-05-22 RX ORDER — METHYLPREDNISOLONE 4 MG/1
TABLET ORAL
Qty: 1 PACKAGE | Refills: 0 | Status: SHIPPED | OUTPATIENT
Start: 2017-05-22 | End: 2017-06-12

## 2017-05-22 RX ORDER — SODIUM CHLORIDE 9 MG/ML
INJECTION, SOLUTION INTRAVENOUS CONTINUOUS
Status: DISCONTINUED | OUTPATIENT
Start: 2017-05-22 | End: 2017-05-22 | Stop reason: HOSPADM

## 2017-05-22 RX ORDER — HYDROCODONE BITARTRATE AND ACETAMINOPHEN 5; 325 MG/1; MG/1
1 TABLET ORAL EVERY 6 HOURS PRN
Qty: 7 TABLET | Refills: 0 | Status: SHIPPED | OUTPATIENT
Start: 2017-05-22 | End: 2017-06-27 | Stop reason: ALTCHOICE

## 2017-05-22 RX ORDER — ONDANSETRON 4 MG/1
8 TABLET, ORALLY DISINTEGRATING ORAL EVERY 8 HOURS PRN
Qty: 8 TABLET | Refills: 0 | Status: SHIPPED | OUTPATIENT
Start: 2017-05-22 | End: 2017-06-27 | Stop reason: ALTCHOICE

## 2017-05-22 RX ORDER — FENTANYL CITRATE 50 UG/ML
50 INJECTION, SOLUTION INTRAMUSCULAR; INTRAVENOUS
Status: DISCONTINUED | OUTPATIENT
Start: 2017-05-22 | End: 2017-05-22 | Stop reason: HOSPADM

## 2017-05-22 RX ORDER — MIDAZOLAM HYDROCHLORIDE 1 MG/ML
INJECTION, SOLUTION INTRAMUSCULAR; INTRAVENOUS CODE/TRAUMA/SEDATION MEDICATION
Status: COMPLETED | OUTPATIENT
Start: 2017-05-22 | End: 2017-05-22

## 2017-05-22 RX ORDER — MIDAZOLAM HYDROCHLORIDE 1 MG/ML
2 INJECTION INTRAMUSCULAR; INTRAVENOUS
Status: DISCONTINUED | OUTPATIENT
Start: 2017-05-22 | End: 2017-05-22 | Stop reason: HOSPADM

## 2017-05-22 RX ORDER — LIDOCAINE HYDROCHLORIDE 10 MG/ML
1 INJECTION, SOLUTION EPIDURAL; INFILTRATION; INTRACAUDAL; PERINEURAL ONCE AS NEEDED
Status: COMPLETED | OUTPATIENT
Start: 2017-05-22 | End: 2017-05-22

## 2017-05-22 RX ORDER — DEXAMETHASONE SODIUM PHOSPHATE 100 MG/10ML
INJECTION INTRAMUSCULAR; INTRAVENOUS CODE/TRAUMA/SEDATION MEDICATION
Status: COMPLETED | OUTPATIENT
Start: 2017-05-22 | End: 2017-05-22

## 2017-05-22 RX ORDER — FENTANYL CITRATE 50 UG/ML
INJECTION, SOLUTION INTRAMUSCULAR; INTRAVENOUS CODE/TRAUMA/SEDATION MEDICATION
Status: COMPLETED | OUTPATIENT
Start: 2017-05-22 | End: 2017-05-22

## 2017-05-22 RX ORDER — OXYCODONE AND ACETAMINOPHEN 5; 325 MG/1; MG/1
1 TABLET ORAL EVERY 4 HOURS PRN
Status: DISCONTINUED | OUTPATIENT
Start: 2017-05-22 | End: 2017-05-22 | Stop reason: HOSPADM

## 2017-05-22 RX ADMIN — FENTANYL CITRATE 25 MCG: 50 INJECTION, SOLUTION INTRAMUSCULAR; INTRAVENOUS at 09:05

## 2017-05-22 RX ADMIN — FENTANYL CITRATE 50 MCG: 50 INJECTION, SOLUTION INTRAMUSCULAR; INTRAVENOUS at 09:05

## 2017-05-22 RX ADMIN — SODIUM CHLORIDE 3 G: 9 INJECTION, SOLUTION INTRAVENOUS at 08:05

## 2017-05-22 RX ADMIN — MIDAZOLAM HYDROCHLORIDE 0.5 MG: 1 INJECTION, SOLUTION INTRAMUSCULAR; INTRAVENOUS at 09:05

## 2017-05-22 RX ADMIN — LIDOCAINE HYDROCHLORIDE 10 MG: 10 INJECTION, SOLUTION EPIDURAL; INFILTRATION; INTRACAUDAL; PERINEURAL at 08:05

## 2017-05-22 RX ADMIN — MIDAZOLAM HYDROCHLORIDE 1 MG: 1 INJECTION, SOLUTION INTRAMUSCULAR; INTRAVENOUS at 09:05

## 2017-05-22 RX ADMIN — SODIUM CHLORIDE: 0.9 INJECTION, SOLUTION INTRAVENOUS at 08:05

## 2017-05-22 RX ADMIN — DEXAMETHASONE SODIUM PHOSPHATE 20 MG: 10 INJECTION INTRAMUSCULAR; INTRAVENOUS at 09:05

## 2017-05-22 RX ADMIN — IOHEXOL 60 ML: 300 INJECTION, SOLUTION INTRAVENOUS at 10:05

## 2017-05-22 NOTE — SEDATION DOCUMENTATION
Hemostasis achieved via right groin with use of ExoSeal closure device at 0957. Pt to lay flat for 2 hours until 11:57

## 2017-05-22 NOTE — TELEPHONE ENCOUNTER
MA called patient inform him that per MD he need to hold his Spironolactone and repeat his labs in 1 week due to Hyperkalemia. Patient understood and would like to schedule his labs in [facility].    Route message to MD to put the order in for labs. YUSUF

## 2017-05-22 NOTE — PROGRESS NOTES
Pt discharged to home.  Discharge instructions given, pt and parents stated understanding.  Dressing to groin dry and intact, IV removed.  Pt left via wheelchair with parents to home.

## 2017-05-22 NOTE — DISCHARGE INSTRUCTIONS
For scheduling: Call Malissa at [phone]    For questions or concerns call: [facility] 8 AM- 5PM [phone]. Radiology resident on call [phone].    For immediate concerns that are not emergent, you may call our radiology clinic at: [phone]

## 2017-05-22 NOTE — PROGRESS NOTES
Pt arrived to [facility], bay 3. Report received from DOUG Conley. Dressing to groin dry and intact. Family at bedside.

## 2017-05-22 NOTE — DISCHARGE SUMMARY
Radiology Discharge Summary      Hospital Course: No complications    Admit Date: 5/22/2017  Discharge Date: 05/22/2017     Instructions Given to Patient: Yes  Diet: Resume prior diet  Activity: activity as tolerated and no driving for today    Description of Condition on Discharge: Stable  Vital Signs (Most Recent): Temp: 97.8 °F (36.6 °C) (05/22/17 1015)  Pulse: (!) 49 (05/22/17 1130)  Resp: 16 (05/22/17 1130)  BP: (!) 115/54 (05/22/17 1130)  SpO2: 100 % (05/22/17 1130)    Discharge Disposition: Home    Discharge Diagnosis: HCC    Michael Magallanes M.D.  Diagnostic and Interventional Radiologist  Department of Radiology  Pager: [phone]

## 2017-05-22 NOTE — H&P
Radiology History & Physical      SUBJECTIVE:     Chief Complaint: HCC    History of Present Illness:  Martin Houston is a 56 y.o. male who presents for Yttrium  Past Medical History:   Diagnosis Date    Anemia     Bone spur of foot     patient bone spurs removed    Cirrhosis     Fracture     Right tibia/fibula    Gallstones     GERD (gastroesophageal reflux disease)     Hernia     patient reports 2 herina in the groin area    Personal history of kidney stones      Past Surgical History:   Procedure Laterality Date    TONSILLECTOMY         Home Meds:   Prior to Admission medications    Medication Sig Start Date End Date Taking? Authorizing Provider   ascorbic acid, vitamin C, (VITAMIN C) 500 MG tablet Take 500 mg by mouth once daily.   Yes Historical Provider, MD   ferrous sulfate 325 mg (65 mg iron) Tab tablet Take 325 mg by mouth 2 (two) times daily.   Yes Historical Provider, MD   furosemide (LASIX) 20 MG tablet Take 1 tablet (20 mg total) by mouth once daily. 4/6/17 4/6/18 Yes Sigrid Nolan MD   ibuprofen (ADVIL,MOTRIN) 200 MG tablet Take 200 mg by mouth once daily.   Yes Historical Provider, MD   multivitamin-zinc gluconate (SOURCECF PED VITAMIN WITH ZINC) 5 mg/mL Drop Take by mouth.   Yes Historical Provider, MD   nadolol (CORGARD) 40 MG tablet Take 40 mg by mouth once daily.   Yes Historical Provider, MD   omeprazole (PRILOSEC) 20 MG capsule Take 20 mg by mouth once daily.   Yes Historical Provider, MD   spironolactone (ALDACTONE) 50 MG tablet Take 1 tablet (50 mg total) by mouth once daily. 4/6/17 4/6/18 Yes Sigrid Nolan MD   polyethylene glycol (GLYCOLAX) 17 gram PwPk Take 17 g by mouth once daily.    Historical Provider, MD     Anticoagulants/Antiplatelets: aspirin; last dose 2 days ago    Allergies: Review of patient's allergies indicates:  No Known Allergies  Sedation History:  no adverse reactions    Review of Systems:   Hematological: no known coagulopathies  Respiratory: no  shortness of breath  Cardiovascular: no chest pain  Gastrointestinal: no abdominal pain  Genito-Urinary: no dysuria  Musculoskeletal: negative  Neurological: no TIA or stroke symptoms         OBJECTIVE:     Vital Signs (Most Recent)  Temp: 98 °F (36.7 °C) (05/22/17 0746)  Pulse: (!) 47 (05/22/17 0746)  Resp: 16 (05/22/17 0746)  BP: (!) 129/59 (05/22/17 0746)  SpO2: 100 % (05/22/17 0746)    Physical Exam:  ASA: 2  Mallampati: 2    General: no acute distress  Mental Status: alert and oriented to person, place and time  HEENT: normocephalic, atraumatic  Chest: unlabored breathing  Heart: regular heart rate  Abdomen: nondistended  Extremity: moves all extremities    Laboratory  Lab Results   Component Value Date    INR 1.1 05/22/2017       Lab Results   Component Value Date    WBC 4.01 05/22/2017    HGB 12.6 (L) 05/22/2017    HCT 38.5 (L) 05/22/2017    MCV 92 05/22/2017     (L) 05/22/2017      Lab Results   Component Value Date     05/22/2017     05/22/2017    K 5.3 (H) 05/22/2017     05/22/2017    CO2 27 05/22/2017    BUN 23 (H) 05/22/2017    CREATININE 0.8 05/22/2017    CALCIUM 9.3 05/22/2017     (H) 05/22/2017     (H) 05/22/2017    ALBUMIN 3.3 (L) 05/22/2017    BILITOT 1.5 (H) 05/22/2017    BILIDIR 0.7 (H) 05/22/2017       ASSESSMENT/PLAN:     Sedation Plan: moderate  Patient will undergo yttrium.    Rosa M Roe, APRN, FNP  Interventional Radiology  [phone] [employer]

## 2017-05-22 NOTE — TELEPHONE ENCOUNTER
----- Message from Sigrid Nolan MD sent at 5/22/2017  3:05 PM CDT -----  Patient should hold spironolactone and have repeat labs in 1 week locally for hyperkalemia.

## 2017-05-23 ENCOUNTER — TELEPHONE (OUTPATIENT)
Dept: HEPATOLOGY | Facility: CLINIC | Age: 57
End: 2017-05-23

## 2017-05-23 DIAGNOSIS — C22.0 HEPATOCELLULAR CARCINOMA: Primary | ICD-10-CM

## 2017-05-23 NOTE — TELEPHONE ENCOUNTER
Labs and imaging will be placed by IR.  No additional labs required at this time for hepatology visit.

## 2017-05-23 NOTE — TELEPHONE ENCOUNTER
----- Message from Sigrid Nolan MD sent at 5/23/2017 10:04 AM CDT -----  Patient with embolization for HCC.  Please schedule hepatology f/u for 1 month on same day as IR clinic

## 2017-05-23 NOTE — TELEPHONE ENCOUNTER
MA called patient inform him that we have schedule his follow up visit in IR and to see Dr. Nolan.     He accepted 6/27/17 mailed appt reminder to patient.

## 2017-06-27 ENCOUNTER — OFFICE VISIT (OUTPATIENT)
Dept: HEPATOLOGY | Facility: CLINIC | Age: 57
End: 2017-06-27
Payer: MEDICAID

## 2017-06-27 ENCOUNTER — OFFICE VISIT (OUTPATIENT)
Dept: INTERVENTIONAL RADIOLOGY/VASCULAR | Facility: CLINIC | Age: 57
End: 2017-06-27
Attending: INTERNAL MEDICINE
Payer: MEDICAID

## 2017-06-27 VITALS
HEART RATE: 48 BPM | HEIGHT: 70 IN | WEIGHT: 185 LBS | BODY MASS INDEX: 26.48 KG/M2 | SYSTOLIC BLOOD PRESSURE: 156 MMHG | DIASTOLIC BLOOD PRESSURE: 68 MMHG | WEIGHT: 184.75 LBS | SYSTOLIC BLOOD PRESSURE: 141 MMHG | HEIGHT: 70 IN | BODY MASS INDEX: 26.45 KG/M2 | HEART RATE: 47 BPM | DIASTOLIC BLOOD PRESSURE: 68 MMHG | OXYGEN SATURATION: 100 % | TEMPERATURE: 97 F | RESPIRATION RATE: 20 BRPM

## 2017-06-27 DIAGNOSIS — K70.31 ALCOHOLIC CIRRHOSIS OF LIVER WITH ASCITES: Primary | ICD-10-CM

## 2017-06-27 DIAGNOSIS — C22.0 HEPATOCELLULAR CARCINOMA: Primary | ICD-10-CM

## 2017-06-27 DIAGNOSIS — C22.0 HCC (HEPATOCELLULAR CARCINOMA): ICD-10-CM

## 2017-06-27 PROCEDURE — 99999 PR PBB SHADOW E&M-EST. PATIENT-LVL III: CPT | Mod: PBBFAC,,,

## 2017-06-27 PROCEDURE — 99999 PR PBB SHADOW E&M-EST. PATIENT-LVL III: CPT | Mod: PBBFAC,,, | Performed by: INTERNAL MEDICINE

## 2017-06-27 PROCEDURE — 99213 OFFICE O/P EST LOW 20 MIN: CPT | Mod: PBBFAC,27 | Performed by: INTERNAL MEDICINE

## 2017-06-27 PROCEDURE — 99214 OFFICE O/P EST MOD 30 MIN: CPT | Mod: S$PBB,,, | Performed by: INTERNAL MEDICINE

## 2017-06-27 PROCEDURE — 99213 OFFICE O/P EST LOW 20 MIN: CPT | Mod: S$PBB,,, | Performed by: FAMILY MEDICINE

## 2017-06-27 RX ORDER — NADOLOL 20 MG/1
20 TABLET ORAL DAILY
Qty: 30 TABLET | Refills: 11 | Status: SHIPPED | OUTPATIENT
Start: 2017-06-27 | End: 2017-11-15

## 2017-06-27 NOTE — PROGRESS NOTES
Subjective:       Patient ID: Martin Houston is a 56 y.o. male.    Chief Complaint: Cancer    Patient here for follow up of his hepatocellular carcinoma. He was recently treated with radioembolization 2017. He had an AFP drawn today. He reports his appetite has improved since our last visit. He continues to complain of fatigue and feels it is worsening. He tells me sometimes he has to pull over on the side of the road and take a nap. He reports occasional constipation that goes away on its own. He reports after his procedure he was sick for 3 days, but his symptoms have resolved.      Review of Systems   Constitutional: Positive for appetite change (improved) and fatigue. Negative for activity change, chills and fever.   Respiratory: Negative for cough, shortness of breath, wheezing and stridor.    Cardiovascular: Negative for chest pain, palpitations and leg swelling.   Gastrointestinal: Positive for abdominal pain (occasional) and constipation (occasional). Negative for abdominal distention, diarrhea, nausea and vomiting.       Objective:      Physical Exam   Constitutional: He is oriented to person, place, and time. He appears well-developed and well-nourished. No distress.   Cardiovascular: Regular rhythm, normal heart sounds and intact distal pulses.  Bradycardia present.  Exam reveals no gallop and no friction rub.    No murmur heard.  Pulmonary/Chest: Effort normal and breath sounds normal. No respiratory distress. He has no wheezes. He has no rales.   Abdominal: Soft. Bowel sounds are normal. He exhibits no distension and no mass. There is no hepatosplenomegaly. There is no tenderness. There is no guarding.   Neurological: He is alert and oriented to person, place, and time. Gait normal.   Skin: Skin is warm and dry. He is not diaphoretic.   Psychiatric: He has a normal mood and affect.   Vitals reviewed.    ECO  Assessment:       1. Hepatocellular carcinoma        Plan:         Explained not  uncommon to be sick after radioembolization. AFP trending down from April. It was 30 on 4/6/2017; today it is 27. Explained this is a favorable response. We will know more once he has his CT scan in August. Patient verbalized understanding and agreement.     Suggested he talk to either Dr. Nolan or the doctor that prescribed his nadolol and let them know of his fatigue. Explained this medication may be a contributing factor. Verbalized understanding and agreement.

## 2017-06-27 NOTE — LETTER
July 2, 2017      Benji Moyer MD  [address] [facility]  [address]  [facility] [address]           [facility] - Hepatology  [address]  [place] [address]  Phone: [phone]  Fax: [phone]          Patient: Martin Houston   MR Number: [id number]   YOB: 1960   Date of Visit: 6/27/2017       Dear No ref. provider found:    Thank you for referring Martin Houston to me for evaluation. Attached you will find relevant portions of my assessment and plan of care.    If you have questions, please do not hesitate to call me. I look forward to following Martin Houston along with you.    Sincerely,    Sigrid Nolan MD    Enclosure  CC:  No Recipients    If you would like to receive this communication electronically, please contact [email or web address] or [phone] to request more information on "[employer]" Link access.    For providers and/or their staff who would like to refer a patient to [facility], please contact us through our one-stop-shop provider referral line, [facility], at [phone].    If you feel you have received this communication in error or would no longer like to receive these types of communications, please e-mail [email or web address]

## 2017-06-27 NOTE — PATIENT INSTRUCTIONS
Continue to take lasix.  Do NOT take spironolactone.      We will reduce nadolol to 20mg daily.  Check your heart rate periodically.      We will see you in clinic in 3 months after you have your surveillance CT scan and radiology visit.

## 2017-06-27 NOTE — PROGRESS NOTES
Hepatology Clinic Follow-up Visit       Original Referring Provider: Benji Moyer  Current Corresponding Physician: Benji MENESES Native Liver Diagnosis: Primary Liver Malignancy: Hepatoma (HCC) and Cirrhosis    Reason for Visit: HCC management      Subjective:     Martin Houston is a 56 y.o. 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  The patient is accompanied by his parents and aunt.    Overall the patient reports that he continues to feel well.  He is working without difficulty.  Underwent Yttrium radioembolization without complication.  Seen in IR clinic with plan for 3 month surveillance imaging.  Patient denies GI bleeding, ascites, HE or jaundice.      He does report some fatigue and lightheadedness in the setting of bradycardia with heart rates down to 40s.     PMH:   Alcoholic and hepatitis C cirrhosis  HCC    PSH:  No abdominal surgeries;  Right knee surgery     FH: no family history of liver disease    SH:  No alcohol consumption, continuing to smoke tobacco, no illicit drug use     Review of Systems   Constitutional: Positive for activity change and fatigue. Negative for appetite change, chills and unexpected weight change.   HENT: Negative for hearing loss and sore throat.    Eyes: Negative for visual disturbance.   Respiratory: Negative for shortness of breath.    Cardiovascular: Positive for leg swelling. Negative for chest pain.   Gastrointestinal: Negative for abdominal distention, abdominal pain, blood in stool, nausea and vomiting.   Musculoskeletal: Negative for gait problem.   Skin: Negative for rash.   Neurological: Negative for weakness and headaches.   Hematological: Negative for adenopathy. Does not bruise/bleed easily.   Psychiatric/Behavioral: Negative for confusion and decreased concentration.       Objective:   Physical Exam   Constitutional: He is oriented to person, place, and time. He appears well-developed and well-nourished. No  distress.   HENT:   Head: Normocephalic and atraumatic.   Mouth/Throat: Oropharynx is clear and moist. No oropharyngeal exudate.   Eyes: Conjunctivae are normal. Pupils are equal, round, and reactive to light. No scleral icterus.   Neck: Normal range of motion. Neck supple. No thyromegaly present.   Cardiovascular: Normal rate, regular rhythm and normal heart sounds.  Exam reveals no gallop and no friction rub.    No murmur heard.  Pulmonary/Chest: Effort normal and breath sounds normal. No respiratory distress. He has no wheezes. He has no rales.   Abdominal: Soft. Bowel sounds are normal. He exhibits no distension. There is no tenderness. There is no rebound and no guarding.   Musculoskeletal: Normal range of motion. He exhibits edema (mild to moderate).   Lymphadenopathy:     He has no cervical adenopathy.   Neurological: He is alert and oriented to person, place, and time. No cranial nerve deficit.   Skin: Skin is warm and dry. No erythema.   Psychiatric: He has a normal mood and affect. His behavior is normal.   Vitals reviewed.    MELD-Na score: 9 at 5/22/2017  7:00 AM  MELD score: 9 at 5/22/2017  7:00 AM  Calculated from:  Serum Creatinine: 0.8 mg/dL (Rounded to 1) at 5/22/2017  7:00 AM  Serum Sodium: 143 mmol/L (Rounded to 137) at 5/22/2017  7:00 AM  Total Bilirubin: 1.5 mg/dL at 5/22/2017  7:00 AM  INR(ratio): 1.1 at 5/22/2017  7:00 AM  Age: 56 years     Lab Results   Component Value Date     05/22/2017    BUN 23 (H) 05/22/2017    CREATININE 0.8 05/22/2017    CALCIUM 9.3 05/22/2017     05/22/2017    K 5.3 (H) 05/22/2017     05/22/2017    PROT 7.8 05/22/2017    CO2 27 05/22/2017    WBC 4.01 05/22/2017    RBC 4.20 (L) 05/22/2017    HGB 12.6 (L) 05/22/2017    HCT 38.5 (L) 05/22/2017     (L) 05/22/2017     Lab Results   Component Value Date    CHOL 139 04/06/2017    TRIG 35 04/06/2017    HDL 52 04/06/2017    CHOLHDL 37.4 04/06/2017    TOTALCHOLEST 2.7 04/06/2017    ALBUMIN 3.3 (L)  05/22/2017    BILITOT 1.5 (H) 05/22/2017     (H) 05/22/2017     (H) 05/22/2017    ALKPHOS 116 05/22/2017    LABPROT 11.8 05/22/2017    INR 1.1 05/22/2017       Diagnostics: EMR reviewed     Transplant Hepatology - Candidacy   Assessment/Plan:     Transplant Candidacy: Patient is not transplant candidate at this time due to extent of malignancy.      HCC:  Undergoing locoregional therapy, tolerated yttrium with plan for 3 month surveillance.  Based on these results, will determine response and direct liver related care.      Volume overload: Continuing lasix for mild overload.  Holding spironolactone due to hyperkalemia     Bradycardia:  Decrease nadolol to 20mg daily       RTC in approximately 3 months with surveillance imaging after HCC treatment     Sigrid Nolan MD     dad - [phone] [facility] (may call if you can't reach patient)      [facility] Patient Status  Functional Status: 80% - Normal activity with effort: some symptoms of disease  Physical Capacity: No Limitations    Outside Records Request: none

## 2017-07-26 ENCOUNTER — TELEPHONE (OUTPATIENT)
Dept: HEPATOLOGY | Facility: CLINIC | Age: 57
End: 2017-07-26

## 2017-07-26 NOTE — TELEPHONE ENCOUNTER
Received call from Concepcion Morillo NP with [facility].  KOFI Francisco says patient has been having chest congestion,wheezing and productive cough. And wants to check with Dr Nolan if it would be okay to start patient on antibiotics and short course of steroids. And that it won't interfere with medications that patient is currently taking. Informed KOFI Francisco that I will send Dr Nolan a message regarding her request.   KOFI Francisco left contact information for Dr Nolan [phone].

## 2017-07-28 ENCOUNTER — TELEPHONE (OUTPATIENT)
Dept: HEPATOLOGY | Facility: CLINIC | Age: 57
End: 2017-07-28

## 2017-07-28 NOTE — TELEPHONE ENCOUNTER
Returned call to [facility] and spoke with Concepcion Morillo NP. Says patient had scratches noted to his arm and inside of his nostril, and was given Topical Bactroban. And had given him Mucinex DM for his wheezing and chest congestion. And wanted to make Provider aware. Also, still would like to order antibiotics on the patient. Informed her that I will send message to provider. KOFI Francisco verbalizes understanding.

## 2017-07-28 NOTE — TELEPHONE ENCOUNTER
----- Message from Gail Posadas sent at 7/27/2017  3:37 PM CDT -----  Contact: Eunice from [facility] primary care   Calling to speak with Dr. Nolan or get records sent on this patient. Please call  or fax

## 2017-07-28 NOTE — TELEPHONE ENCOUNTER
Called and spoke with patient. Informed patient that the nurse has spoke with Concepcion Morillo NP regarding his antibiotics. And the message has been given to Dr Nolan. And we awaiting a response.  Patient verbalizes understanding.

## 2017-07-28 NOTE — TELEPHONE ENCOUNTER
----- Message from Gail Posadas sent at 7/28/2017  9:09 AM CDT -----  Contact: patient   Patient states that his doctor has been calling to get an approval on an antibiotic that she prescribe. Please call Concepcion Tompkins she located at [facility] in [facility] or call

## 2017-08-02 NOTE — TELEPHONE ENCOUNTER
Returned call and message left.  Patient may have steroids and antibiotics for URI.  No issues with regards to his liver disease.  Return telephone number left if further discussion desired.

## 2017-08-28 ENCOUNTER — HOSPITAL ENCOUNTER (OUTPATIENT)
Dept: RADIOLOGY | Facility: HOSPITAL | Age: 57
Discharge: HOME | End: 2017-08-28
Attending: FAMILY MEDICINE
Payer: MEDICAID

## 2017-08-28 ENCOUNTER — OFFICE VISIT (OUTPATIENT)
Dept: INTERVENTIONAL RADIOLOGY/VASCULAR | Facility: CLINIC | Age: 57
End: 2017-08-28
Attending: FAMILY MEDICINE
Payer: MEDICAID

## 2017-08-28 VITALS
DIASTOLIC BLOOD PRESSURE: 65 MMHG | SYSTOLIC BLOOD PRESSURE: 134 MMHG | HEIGHT: 70 IN | HEART RATE: 47 BPM | BODY MASS INDEX: 25.48 KG/M2 | WEIGHT: 178 LBS

## 2017-08-28 DIAGNOSIS — C22.0 HEPATOCELLULAR CARCINOMA: Primary | ICD-10-CM

## 2017-08-28 DIAGNOSIS — C22.0 HEPATOCELLULAR CARCINOMA: ICD-10-CM

## 2017-08-28 PROCEDURE — 25500020 PHARM REV CODE 255: Performed by: FAMILY MEDICINE

## 2017-08-28 PROCEDURE — 74177 CT ABD & PELVIS W/CONTRAST: CPT | Mod: 26,,, | Performed by: RADIOLOGY

## 2017-08-28 PROCEDURE — 3008F BODY MASS INDEX DOCD: CPT | Mod: ,,, | Performed by: FAMILY MEDICINE

## 2017-08-28 PROCEDURE — 99213 OFFICE O/P EST LOW 20 MIN: CPT | Mod: S$PBB,,, | Performed by: FAMILY MEDICINE

## 2017-08-28 PROCEDURE — 74177 CT ABD & PELVIS W/CONTRAST: CPT | Mod: TC

## 2017-08-28 PROCEDURE — 99213 OFFICE O/P EST LOW 20 MIN: CPT | Mod: PBBFAC,25

## 2017-08-28 PROCEDURE — 99999 PR PBB SHADOW E&M-EST. PATIENT-LVL III: CPT | Mod: PBBFAC,,,

## 2017-08-28 RX ADMIN — IOHEXOL 100 ML: 350 INJECTION, SOLUTION INTRAVENOUS at 09:08

## 2017-08-28 NOTE — PROGRESS NOTES
Subjective:       Patient ID: Martin Houston is a 56 y.o. male.    Chief Complaint: Cancer    Patient here for follow up of his hepatocellular carcinoma treated with radioembolization on 5/22/2017. He reports feeling tired and complains of disinterest in activities and foods that he used to enjoy. He is accompanied by his family. He had a CT scan performed this morning.      Review of Systems   Constitutional: Positive for activity change, appetite change and fatigue. Negative for chills and fever.   Respiratory: Negative for cough, shortness of breath, wheezing and stridor.    Cardiovascular: Negative for chest pain and leg swelling.   Gastrointestinal: Negative for abdominal distention, abdominal pain, constipation, diarrhea, nausea and vomiting.       Objective:      Physical Exam   Constitutional: He is oriented to person, place, and time. He appears well-developed and well-nourished. No distress.   Cardiovascular: Normal rate, regular rhythm and normal heart sounds.  Exam reveals no gallop and no friction rub.    No murmur heard.  Pulmonary/Chest: Effort normal and breath sounds normal. No respiratory distress. He has no wheezes. He has no rales.   Abdominal: Soft. Bowel sounds are normal. He exhibits no distension and no mass. There is no hepatosplenomegaly. There is no tenderness. There is no guarding.   Neurological: He is alert and oriented to person, place, and time. Gait normal.   Skin: Skin is warm and dry. He is not diaphoretic.   Psychiatric: He has a normal mood and affect.   Vitals reviewed.      Assessment:       1. Hepatocellular carcinoma        Plan:         Explained Dr. Magallanes viewed images from CT scan this morning. He has recommended repeating radioembolization. He would like to repeat mapping before treating. Yttrium 90 Radioembolization discussed in detail with the patient including risks, benefits, potential complications, usual pre and post procedure course.  Discussed the need  "for initial Angiogram mapping study prior to scheduling the actual Radioembolization procedure.  Patient and family verbalize understanding and agreement. They are familiar with this procedure. Patient scheduled for 9/14/2017. Pre-procedure handout with clinic phone number provided.    Patient asks if there is something that can help to give him energy. Suggested he talk to his PCP about antidepressive therapy. Reassured patient very common for patients with a diagnosis of cancer to feel depressed. Explained sometimes antidepressive therapy can help with this loss of interest in his favorite activities and foods. He tells me his PCP always defers to hepatology before prescribing medication. Suggested he contact Dr. Nolan and ask for a list of antidepressives that are safe to take with liver cancer. He can then bring this list to his PCP for a prescription. Patient tells me, "I didn't think I was depressed." I reassure most don't realize until they feel better. Family member also agrees with this statement, and tells patient, "Most don't realize." Patient and family verbalize understanding and agreement. Patient is willing to try this. We confirm Mr. Houston has contact information for Dr. Nolan.  "

## 2017-08-28 NOTE — LETTER
August 28, 2017      Rosa M Roe, KOFI  [address]  [facility] [address]           [facility] Interventional [facility]  [address]  [facility] [address]  Phone: [phone]          Patient: Martin Houston   MR Number: [id number]   YOB: 1960   Date of Visit: 8/28/2017       Dear Rosa M Roe:    Thank you for referring Martin Houston to me for evaluation. Attached you will find relevant portions of my assessment and plan of care.    If you have questions, please do not hesitate to call me. I look forward to following Martin Houston along with you.    Sincerely,    Rosa M Roe NP    Enclosure  CC:  No Recipients    If you would like to receive this communication electronically, please contact [email or web address] or [phone] to request more information on [employer] Link access.    For providers and/or their staff who would like to refer a patient to [facility], please contact us through our one-stop-shop provider referral line, [facility] , at [phone].    If you feel you have received this communication in error or would no longer like to receive these types of communications, please e-mail [email or web address]

## 2017-08-29 ENCOUNTER — TELEPHONE (OUTPATIENT)
Dept: HEPATOLOGY | Facility: CLINIC | Age: 57
End: 2017-08-29

## 2017-08-29 NOTE — TELEPHONE ENCOUNTER
Patient: Martin Houston       MRN: [id number]      : 1960     Age: 56 y.o.  [address] [facility] [address]    Provider: Hepatologist - Ovidio    Patient Transplant Status: Not a candidate    Reason for presentation: Reassessment    Clinical Summary: 57yo male with untreated hepatitis C and alcoholic cirrhosis that has undergone Y- for treatment of HCC with surveillance CT concerning for disease progression and tumor thrombus.      Imaging to be reviewed: CT 2017    HCC Treatment History:   Y-90   2017    ABO: O POS    Platelets:   Lab Results   Component Value Date/Time     (L) 2017 07:00 AM     Creatinine:   Lab Results   Component Value Date/Time    CREATININE 0.8 2017 07:00 AM     Bilirubin:   Lab Results   Component Value Date/Time    BILITOT 1.5 (H) 2017 07:00 AM     AFP Last 3 each if available:   Lab Results   Component Value Date/Time    AFP 27 (H) 2017 11:55 AM    AFP 30 (H) 2017 08:55 AM       MELD: MELD-Na score: 9 at 2017  7:00 AM  MELD score: 9 at 2017  7:00 AM  Calculated from:  Serum Creatinine: 0.8 mg/dL (Rounded to 1) at 2017  7:00 AM  Serum Sodium: 143 mmol/L (Rounded to 137) at 2017  7:00 AM  Total Bilirubin: 1.5 mg/dL at 2017  7:00 AM  INR(ratio): 1.1 at 2017  7:00 AM  Age: 56 years    Plan: seen in IR clinic same day as scan and reviewed.  Plan for repeat radioembolization    Follow-up Provider:  Ovidio Magallanes

## 2017-09-01 ENCOUNTER — TELEPHONE (OUTPATIENT)
Dept: HEPATOLOGY | Facility: CLINIC | Age: 57
End: 2017-09-01

## 2017-09-01 DIAGNOSIS — C22.0 HEPATOCELLULAR CARCINOMA: Primary | ICD-10-CM

## 2017-09-01 NOTE — TELEPHONE ENCOUNTER
Spoke with Mrs Leonardo ([phone]). She will give pt a call back about scheduling mapping.     Spoke with pt message given, Pt verbalized understanding. Pt also due for follow up with Ovidio Baker. He will wait until IR call him back to schedule, so that appt can be coordinated.     Yxdcis- [id number]

## 2017-09-01 NOTE — TELEPHONE ENCOUNTER
----- Message from Gail Posadas sent at 9/1/2017  9:41 AM CDT -----  Contact: pt   Patient calling to speak with a nurse about his appt for a mapping. Please call

## 2017-09-05 ENCOUNTER — TELEPHONE (OUTPATIENT)
Dept: HEPATOLOGY | Facility: CLINIC | Age: 57
End: 2017-09-05

## 2017-09-05 NOTE — TELEPHONE ENCOUNTER
----- Message from Gail Posadas sent at 9/5/2017 10:56 AM CDT -----  Contact: patient   Patient states he has been having some pain and would like to speak with his nurse. Please call  [phone]

## 2017-09-05 NOTE — TELEPHONE ENCOUNTER
MA called patient back, patient stated that he is having a lot of pain specially on his abdomen area. He said since he had that CT-Scan done he is been in PAIN then. Inform patient that he have to go to the Nearest ER if he cannot tolerate the pain anymore. Patient scheduled for embolization 9/14. He said he will go to ER. Route message to Dr. Nolan.

## 2017-09-13 ENCOUNTER — TELEPHONE (OUTPATIENT)
Dept: INTERVENTIONAL RADIOLOGY/VASCULAR | Facility: HOSPITAL | Age: 57
End: 2017-09-13

## 2017-09-13 DIAGNOSIS — C22.0 HEPATOCELLULAR CARCINOMA: Primary | ICD-10-CM

## 2017-09-14 ENCOUNTER — HOSPITAL ENCOUNTER (OUTPATIENT)
Dept: RADIOLOGY | Facility: HOSPITAL | Age: 57
Discharge: HOME | End: 2017-09-14
Attending: FAMILY MEDICINE | Admitting: INTERNAL MEDICINE
Payer: MEDICAID

## 2017-09-14 ENCOUNTER — HOSPITAL ENCOUNTER (OUTPATIENT)
Facility: HOSPITAL | Age: 57
Discharge: HOME | End: 2017-09-14
Attending: INTERNAL MEDICINE | Admitting: RADIOLOGY
Payer: MEDICAID

## 2017-09-14 ENCOUNTER — SURGERY (OUTPATIENT)
Age: 57
End: 2017-09-14

## 2017-09-14 VITALS
TEMPERATURE: 98 F | BODY MASS INDEX: 27.64 KG/M2 | RESPIRATION RATE: 16 BRPM | HEART RATE: 58 BPM | HEIGHT: 66 IN | OXYGEN SATURATION: 98 % | DIASTOLIC BLOOD PRESSURE: 73 MMHG | SYSTOLIC BLOOD PRESSURE: 120 MMHG | WEIGHT: 172 LBS

## 2017-09-14 DIAGNOSIS — C22.0 HEPATOCELLULAR CARCINOMA: ICD-10-CM

## 2017-09-14 DIAGNOSIS — C22.0 HCC (HEPATOCELLULAR CARCINOMA): ICD-10-CM

## 2017-09-14 PROCEDURE — 37243 VASC EMBOLIZE/OCCLUDE ORGAN: CPT

## 2017-09-14 PROCEDURE — A9540 TC99M MAA: HCPCS

## 2017-09-14 PROCEDURE — 63600175 PHARM REV CODE 636 W HCPCS: Performed by: RADIOLOGY

## 2017-09-14 PROCEDURE — 78201 LIVER IMAGING STATIC ONLY: CPT | Mod: TC

## 2017-09-14 PROCEDURE — 25500020 PHARM REV CODE 255: Performed by: INTERNAL MEDICINE

## 2017-09-14 PROCEDURE — 78201 LIVER IMAGING STATIC ONLY: CPT | Mod: 26,,, | Performed by: RADIOLOGY

## 2017-09-14 RX ORDER — FENTANYL CITRATE 50 UG/ML
INJECTION, SOLUTION INTRAMUSCULAR; INTRAVENOUS CODE/TRAUMA/SEDATION MEDICATION
Status: COMPLETED | OUTPATIENT
Start: 2017-09-14 | End: 2017-09-14

## 2017-09-14 RX ORDER — FENTANYL CITRATE 50 UG/ML
50 INJECTION, SOLUTION INTRAMUSCULAR; INTRAVENOUS
Status: DISCONTINUED | OUTPATIENT
Start: 2017-09-14 | End: 2017-09-14 | Stop reason: HOSPADM

## 2017-09-14 RX ORDER — MIDAZOLAM HYDROCHLORIDE 1 MG/ML
INJECTION, SOLUTION INTRAMUSCULAR; INTRAVENOUS CODE/TRAUMA/SEDATION MEDICATION
Status: COMPLETED | OUTPATIENT
Start: 2017-09-14 | End: 2017-09-14

## 2017-09-14 RX ORDER — LIDOCAINE HYDROCHLORIDE 10 MG/ML
1 INJECTION, SOLUTION EPIDURAL; INFILTRATION; INTRACAUDAL; PERINEURAL ONCE AS NEEDED
Status: DISCONTINUED | OUTPATIENT
Start: 2017-09-14 | End: 2017-09-14 | Stop reason: HOSPADM

## 2017-09-14 RX ORDER — HEPARIN SODIUM 200 [USP'U]/100ML
500 INJECTION, SOLUTION INTRAVENOUS CONTINUOUS
Status: DISCONTINUED | OUTPATIENT
Start: 2017-09-14 | End: 2017-09-14 | Stop reason: HOSPADM

## 2017-09-14 RX ORDER — SODIUM CHLORIDE 9 MG/ML
INJECTION, SOLUTION INTRAVENOUS CONTINUOUS
Status: DISCONTINUED | OUTPATIENT
Start: 2017-09-14 | End: 2017-09-14 | Stop reason: HOSPADM

## 2017-09-14 RX ORDER — MIDAZOLAM HYDROCHLORIDE 1 MG/ML
2 INJECTION INTRAMUSCULAR; INTRAVENOUS
Status: DISCONTINUED | OUTPATIENT
Start: 2017-09-14 | End: 2017-09-14 | Stop reason: HOSPADM

## 2017-09-14 RX ADMIN — FENTANYL CITRATE 25 MCG: 50 INJECTION, SOLUTION INTRAMUSCULAR; INTRAVENOUS at 02:09

## 2017-09-14 RX ADMIN — MIDAZOLAM HYDROCHLORIDE 0.5 MG: 1 INJECTION, SOLUTION INTRAMUSCULAR; INTRAVENOUS at 01:09

## 2017-09-14 RX ADMIN — IOHEXOL 175 ML: 300 INJECTION, SOLUTION INTRAVENOUS at 02:09

## 2017-09-14 RX ADMIN — FENTANYL CITRATE 25 MCG: 50 INJECTION, SOLUTION INTRAMUSCULAR; INTRAVENOUS at 01:09

## 2017-09-14 RX ADMIN — MIDAZOLAM HYDROCHLORIDE 1 MG: 1 INJECTION, SOLUTION INTRAMUSCULAR; INTRAVENOUS at 01:09

## 2017-09-14 RX ADMIN — MIDAZOLAM HYDROCHLORIDE 0.5 MG: 1 INJECTION, SOLUTION INTRAMUSCULAR; INTRAVENOUS at 02:09

## 2017-09-14 RX ADMIN — FENTANYL CITRATE 50 MCG: 50 INJECTION, SOLUTION INTRAMUSCULAR; INTRAVENOUS at 01:09

## 2017-09-14 NOTE — H&P
Radiology History & Physical      SUBJECTIVE:     Chief Complaint: HCC    History of Present Illness:  Martin Houston is a 56 y.o. male who presents for pre-yttrium  Past Medical History:   Diagnosis Date    Anemia     Bone spur of foot     patient bone spurs removed    Cirrhosis     Fracture     Right tibia/fibula    Gallstones     GERD (gastroesophageal reflux disease)     Hernia     patient reports 2 herina in the groin area    Personal history of kidney stones      Past Surgical History:   Procedure Laterality Date    TONSILLECTOMY         Home Meds:   Prior to Admission medications    Medication Sig Start Date End Date Taking? Authorizing Provider   ascorbic acid, vitamin C, (VITAMIN C) 500 MG tablet Take 500 mg by mouth once daily.   Yes Historical Provider, MD   ferrous sulfate 325 mg (65 mg iron) Tab tablet Take 325 mg by mouth 2 (two) times daily.   Yes Historical Provider, MD   furosemide (LASIX) 20 MG tablet Take 1 tablet (20 mg total) by mouth once daily. 4/6/17 4/6/18 Yes Sigrid Nolan MD   ibuprofen (ADVIL,MOTRIN) 200 MG tablet Take 200 mg by mouth once daily.   Yes Historical Provider, MD   multivitamin-zinc gluconate (SOURCECF PED VITAMIN WITH ZINC) 5 mg/mL Drop Take by mouth.   Yes Historical Provider, MD   nadolol (CORGARD) 20 MG tablet Take 1 tablet (20 mg total) by mouth once daily. 6/27/17 6/27/18 Yes Sigrid Nolan MD   omeprazole (PRILOSEC) 20 MG capsule Take 20 mg by mouth once daily.   Yes Historical Provider, MD   polyethylene glycol (GLYCOLAX) 17 gram PwPk Take 17 g by mouth once daily.   Yes Historical Provider, MD   spironolactone (ALDACTONE) 50 MG tablet Take 1 tablet (50 mg total) by mouth once daily.  Patient taking differently: Take 50 mg by mouth once daily. ON  HOLD AS OF 05/22/2017 4/6/17 4/6/18 Yes Sigrid Nolan MD     Anticoagulants/Antiplatelets: no anticoagulation    Allergies: Review of patient's allergies indicates:  No Known Allergies  Sedation History:   no adverse reactions    Review of Systems:   Hematological: no known coagulopathies  Respiratory: no shortness of breath  Cardiovascular: no chest pain  Gastrointestinal: no abdominal pain  Genito-Urinary: no dysuria  Musculoskeletal: negative  Neurological: no TIA or stroke symptoms         OBJECTIVE:     Vital Signs (Most Recent)  Temp: 98.3 °F (36.8 °C) (09/14/17 0951)  Pulse: 80 (09/14/17 0951)  Resp: 16 (09/14/17 0951)  BP: 124/68 (09/14/17 0952)  SpO2: 100 % (09/14/17 0951)    Physical Exam:  ASA: 2  Mallampati: 2    General: no acute distress  Mental Status: alert and oriented to person, place and time  HEENT: normocephalic, atraumatic  Chest: unlabored breathing  Heart: regular heart rate  Abdomen: nondistended  Extremity: moves all extremities    Laboratory  Lab Results   Component Value Date    INR 1.1 09/14/2017       Lab Results   Component Value Date    WBC 4.24 09/14/2017    HGB 13.1 (L) 09/14/2017    HCT 38.3 (L) 09/14/2017    MCV 88 09/14/2017     (L) 09/14/2017      Lab Results   Component Value Date     09/14/2017     09/14/2017    K 5.2 (H) 09/14/2017     09/14/2017    CO2 26 09/14/2017    BUN 14 09/14/2017    CREATININE 0.8 09/14/2017    CALCIUM 9.0 09/14/2017    ALT 77 (H) 09/14/2017    AST 91 (H) 09/14/2017    ALBUMIN 2.9 (L) 09/14/2017    BILITOT 2.6 (H) 09/14/2017    BILIDIR 1.4 (H) 09/14/2017       ASSESSMENT/PLAN:     Sedation Plan: Moderate.  Patient will undergo pre-yttrium planning.    Silvestre Ambrosio  Radiology

## 2017-09-14 NOTE — DISCHARGE INSTRUCTIONS
[facility] [phone] (MON-FRI 8 AM- 5PM). Radiology Resident on call [phone].

## 2017-09-14 NOTE — DISCHARGE SUMMARY
Radiology Discharge Summary      Hospital Course: No complications    Admit Date: 9/14/2017  Discharge Date: 09/14/2017     Instructions Given to Patient: Yes  Diet: Resume prior diet  Activity: activity as tolerated and no driving for today    Description of Condition on Discharge: Stable  Vital Signs (Most Recent): Temp: 98.3 °F (36.8 °C) (09/14/17 0951)  Pulse: (!) 51 (09/14/17 1403)  Resp: 11 (09/14/17 1403)  BP: (!) 116/57 (09/14/17 1403)  SpO2: 99 % (09/14/17 1403)    Discharge Disposition: Home    Discharge Diagnosis: HCC. F/U in 2-3 weeks for Y90 delivery.    Michael Magallanes M.D.  Diagnostic and Interventional Radiologist  Department of Radiology  Pager: [phone]

## 2017-09-14 NOTE — SEDATION DOCUMENTATION
Hemostasis achieved at 1405 via right groin with use of Exo Seal closure device. Patient to lie flat until 1605.

## 2017-09-14 NOTE — PROGRESS NOTES
Pt arrived to [facility] bay [facility], no acute distress noted. Report received from DOUG Altman> will continue to monitor.

## 2017-09-14 NOTE — PROCEDURES
Radiology Post-Procedure Note    Pre Op Diagnosis: HCC    Post Op Diagnosis: Same    Procedure: Y90 mapping    Procedure performed by: Michael Magallanes MD    Written Informed Consent Obtained: Yes  Specimen Removed: NO  Estimated Blood Loss: Minimal    Findings:   Via rt cfa, SMA, celiac, proper, LHA, RHA, MHA angiograms obtained. No need to coil vessels. 5 mCi Tc 99 mAA were administered via RHA. Exoseal to right CFA. No complications.    Patient tolerated procedure well.    Michael Magallanes M.D.  Diagnostic and Interventional Radiologist  Department of Radiology  Pager: [phone]

## 2017-09-14 NOTE — PROGRESS NOTES
Recovery completed, pt tolerated well. No apparent distress noted. Dressing CDI. Discharge instructions reviewed and acknowledged. Pt discharged via ambulation, refused wheelchair, steady gait obsereved and private vehicle, accompanied by family.

## 2017-09-18 ENCOUNTER — TELEPHONE (OUTPATIENT)
Dept: HEPATOLOGY | Facility: CLINIC | Age: 57
End: 2017-09-18

## 2017-09-18 DIAGNOSIS — G47.01 INSOMNIA DUE TO MEDICAL CONDITION: Primary | ICD-10-CM

## 2017-09-18 RX ORDER — TRAZODONE HYDROCHLORIDE 50 MG/1
50 TABLET ORAL NIGHTLY PRN
Qty: 30 TABLET | Refills: 11 | Status: ON HOLD | OUTPATIENT
Start: 2017-09-18 | End: 2017-11-29 | Stop reason: HOSPADM

## 2017-09-18 NOTE — TELEPHONE ENCOUNTER
MA attempted to call patient again. He is unable to reached left him VM to please call us back. [email or web address]

## 2017-09-18 NOTE — TELEPHONE ENCOUNTER
----- Message from Sigrid Nolan MD sent at 9/18/2017  2:49 PM CDT -----  Patient should discontinue spironolactone based on elevated potassium on recent labs.

## 2017-09-19 ENCOUNTER — TELEPHONE (OUTPATIENT)
Dept: HEPATOLOGY | Facility: CLINIC | Age: 57
End: 2017-09-19

## 2017-09-19 NOTE — TELEPHONE ENCOUNTER
MA attempted to call patient again he still unable to reached left him message to his friend to please give us a callback. YUSUF

## 2017-09-20 ENCOUNTER — TELEPHONE (OUTPATIENT)
Dept: TRANSPLANT | Facility: CLINIC | Age: 57
End: 2017-09-20

## 2017-09-20 NOTE — TELEPHONE ENCOUNTER
The following message was sent to [facility] .    [9/20/2017 12:58 PM] Brynn Trinidad:   Martin Houston [id number]--[id number] mapping done 9/14/17 paola/Elpidio.  Patient needs to return in ~3 weeks for Yttrium procedure

## 2017-09-25 DIAGNOSIS — C22.0 HEPATOCELLULAR CARCINOMA: Primary | ICD-10-CM

## 2017-10-04 ENCOUNTER — TELEPHONE (OUTPATIENT)
Dept: INTERVENTIONAL RADIOLOGY/VASCULAR | Facility: HOSPITAL | Age: 57
End: 2017-10-04

## 2017-10-04 DIAGNOSIS — C22.0 HEPATOCELLULAR CARCINOMA: Primary | ICD-10-CM

## 2017-10-04 NOTE — TELEPHONE ENCOUNTER
Phone call (complete)   Arrival time- 10       NPO reinforced  Allergies reviewed  Directions given  Instructed to take meds in AM  Has Ride ( yes)             Labs (ordered  )      Approx recovery length discussed

## 2017-10-05 ENCOUNTER — HOSPITAL ENCOUNTER (OUTPATIENT)
Facility: HOSPITAL | Age: 57
Discharge: HOME | End: 2017-10-05
Attending: INTERNAL MEDICINE | Admitting: INTERNAL MEDICINE
Payer: MEDICAID

## 2017-10-05 ENCOUNTER — SURGERY (OUTPATIENT)
Age: 57
End: 2017-10-05

## 2017-10-05 ENCOUNTER — HOSPITAL ENCOUNTER (OUTPATIENT)
Dept: RADIOLOGY | Facility: HOSPITAL | Age: 57
Discharge: HOME | End: 2017-10-05
Attending: FAMILY MEDICINE | Admitting: INTERNAL MEDICINE
Payer: MEDICAID

## 2017-10-05 VITALS
OXYGEN SATURATION: 99 % | WEIGHT: 178 LBS | HEIGHT: 70 IN | DIASTOLIC BLOOD PRESSURE: 50 MMHG | BODY MASS INDEX: 25.48 KG/M2 | RESPIRATION RATE: 16 BRPM | TEMPERATURE: 98 F | HEART RATE: 59 BPM | SYSTOLIC BLOOD PRESSURE: 130 MMHG

## 2017-10-05 DIAGNOSIS — C22.0 HEPATOCELLULAR CARCINOMA: ICD-10-CM

## 2017-10-05 DIAGNOSIS — C22.0 HCC (HEPATOCELLULAR CARCINOMA): ICD-10-CM

## 2017-10-05 PROCEDURE — 34400013 NM LIVER IMAGING STATIC POST Y-90 EMBOLIZATION

## 2017-10-05 PROCEDURE — 78201 LIVER IMAGING STATIC ONLY: CPT | Mod: 26,,, | Performed by: RADIOLOGY

## 2017-10-05 PROCEDURE — 78201 LIVER IMAGING STATIC ONLY: CPT | Mod: TC

## 2017-10-05 PROCEDURE — 63600175 PHARM REV CODE 636 W HCPCS: Performed by: FAMILY MEDICINE

## 2017-10-05 PROCEDURE — 25000003 PHARM REV CODE 250: Performed by: FAMILY MEDICINE

## 2017-10-05 PROCEDURE — 63600175 PHARM REV CODE 636 W HCPCS: Performed by: RADIOLOGY

## 2017-10-05 PROCEDURE — 25500020 PHARM REV CODE 255

## 2017-10-05 RX ORDER — FENTANYL CITRATE 50 UG/ML
INJECTION, SOLUTION INTRAMUSCULAR; INTRAVENOUS CODE/TRAUMA/SEDATION MEDICATION
Status: COMPLETED | OUTPATIENT
Start: 2017-10-05 | End: 2017-10-05

## 2017-10-05 RX ORDER — MIDAZOLAM HYDROCHLORIDE 1 MG/ML
INJECTION INTRAMUSCULAR; INTRAVENOUS CODE/TRAUMA/SEDATION MEDICATION
Status: COMPLETED | OUTPATIENT
Start: 2017-10-05 | End: 2017-10-05

## 2017-10-05 RX ORDER — MIDAZOLAM HYDROCHLORIDE 1 MG/ML
2 INJECTION INTRAMUSCULAR; INTRAVENOUS
Status: DISCONTINUED | OUTPATIENT
Start: 2017-10-05 | End: 2017-10-05 | Stop reason: HOSPADM

## 2017-10-05 RX ORDER — METHYLPREDNISOLONE 4 MG/1
TABLET ORAL
Qty: 1 PACKAGE | Refills: 0 | Status: SHIPPED | OUTPATIENT
Start: 2017-10-05 | End: 2017-10-26

## 2017-10-05 RX ORDER — SODIUM CHLORIDE 9 MG/ML
INJECTION, SOLUTION INTRAVENOUS CONTINUOUS
Status: DISCONTINUED | OUTPATIENT
Start: 2017-10-05 | End: 2017-10-05 | Stop reason: HOSPADM

## 2017-10-05 RX ORDER — HEPARIN SODIUM 200 [USP'U]/100ML
500 INJECTION, SOLUTION INTRAVENOUS CONTINUOUS
Status: DISCONTINUED | OUTPATIENT
Start: 2017-10-05 | End: 2017-10-05 | Stop reason: HOSPADM

## 2017-10-05 RX ORDER — DEXAMETHASONE SODIUM PHOSPHATE 100 MG/10ML
INJECTION INTRAMUSCULAR; INTRAVENOUS CODE/TRAUMA/SEDATION MEDICATION
Status: COMPLETED | OUTPATIENT
Start: 2017-10-05 | End: 2017-10-05

## 2017-10-05 RX ORDER — FENTANYL CITRATE 50 UG/ML
50 INJECTION, SOLUTION INTRAMUSCULAR; INTRAVENOUS
Status: DISCONTINUED | OUTPATIENT
Start: 2017-10-05 | End: 2017-10-05 | Stop reason: HOSPADM

## 2017-10-05 RX ORDER — LIDOCAINE HYDROCHLORIDE 10 MG/ML
1 INJECTION, SOLUTION EPIDURAL; INFILTRATION; INTRACAUDAL; PERINEURAL ONCE AS NEEDED
Status: COMPLETED | OUTPATIENT
Start: 2017-10-05 | End: 2017-10-05

## 2017-10-05 RX ADMIN — DEXAMETHASONE SODIUM PHOSPHATE 20 MG: 10 INJECTION INTRAMUSCULAR; INTRAVENOUS at 03:10

## 2017-10-05 RX ADMIN — FENTANYL CITRATE 50 MCG: 50 INJECTION, SOLUTION INTRAMUSCULAR; INTRAVENOUS at 03:10

## 2017-10-05 RX ADMIN — MIDAZOLAM HYDROCHLORIDE 1 MG: 1 INJECTION, SOLUTION INTRAMUSCULAR; INTRAVENOUS at 03:10

## 2017-10-05 RX ADMIN — AMPICILLIN SODIUM AND SULBACTAM SODIUM 3 G: 2; 1 INJECTION, POWDER, FOR SOLUTION INTRAMUSCULAR; INTRAVENOUS at 02:10

## 2017-10-05 RX ADMIN — SODIUM CHLORIDE: 0.9 INJECTION, SOLUTION INTRAVENOUS at 01:10

## 2017-10-05 RX ADMIN — LIDOCAINE HYDROCHLORIDE 0.2 MG: 10 INJECTION, SOLUTION EPIDURAL; INFILTRATION; INTRACAUDAL; PERINEURAL at 01:10

## 2017-10-05 NOTE — SEDATION DOCUMENTATION
Hemostasis achieved to Right groin via use of exoseal closure device at 1605. Patient to remain HOB flat until 1805.

## 2017-10-05 NOTE — PROCEDURES
Radiology Post-Procedure Note    Pre Op Diagnosis: Hepatocellular carcinoma    Post Op Diagnosis: Same    Procedure: Yttrium treatment    Procedure performed by: Michael Magallanes MD    Written Informed Consent Obtained: Yes    Specimen Removed: No    Estimated Blood Loss: Minimal    Findings:     After placement of a right femoral artery sheath, a 5-Micronesian catheter was inserted and angiography of the celiac artery for anatomic evaluation and localization of hepatic tumor.  A microcatheter was introduced into feeding arteries of a right hepatic lobe tumor and Y90 radioembolization was performed. Post procedural angiography revealed no complications.    Right femoral artery angiogram was performed and the sheath was removed.  Hemostasis was achieved using Exoseal. There was no hematoma at the time of hemostasis.    The patient tolerated procedure well.  Please see Imaging report for further details.      Adam Friend MD  Radiology, PGY - II  [phone]

## 2017-10-05 NOTE — H&P
Radiology History & Physical      SUBJECTIVE:     Chief Complaint: HCC    History of Present Illness:  Martin Houston is a 56 y.o. male who presents for radioembolization.    Past Medical History:   Diagnosis Date    Anemia     Bone spur of foot     patient bone spurs removed    Cirrhosis     Fracture     Right tibia/fibula    Gallstones     GERD (gastroesophageal reflux disease)     Hernia     patient reports 2 herina in the groin area    Personal history of kidney stones      Past Surgical History:   Procedure Laterality Date    TONSILLECTOMY         Home Meds:   Prior to Admission medications    Medication Sig Start Date End Date Taking? Authorizing Provider   ascorbic acid, vitamin C, (VITAMIN C) 500 MG tablet Take 500 mg by mouth once daily.    Historical Provider, MD   ferrous sulfate 325 mg (65 mg iron) Tab tablet Take 325 mg by mouth 2 (two) times daily.    Historical Provider, MD   furosemide (LASIX) 20 MG tablet Take 1 tablet (20 mg total) by mouth once daily. 4/6/17 4/6/18  Sigrid Nolan MD   ibuprofen (ADVIL,MOTRIN) 200 MG tablet Take 200 mg by mouth once daily.    Historical Provider, MD   multivitamin-zinc gluconate (SOURCECF PED VITAMIN WITH ZINC) 5 mg/mL Drop Take by mouth.    Historical Provider, MD   nadolol (CORGARD) 20 MG tablet Take 1 tablet (20 mg total) by mouth once daily. 6/27/17 6/27/18  Sigrid Nolan MD   omeprazole (PRILOSEC) 20 MG capsule Take 20 mg by mouth once daily.    Historical Provider, MD   polyethylene glycol (GLYCOLAX) 17 gram PwPk Take 17 g by mouth once daily.    Historical Provider, MD   spironolactone (ALDACTONE) 50 MG tablet Take 1 tablet (50 mg total) by mouth once daily.  Patient taking differently: Take 50 mg by mouth once daily. ON  HOLD AS OF 05/22/2017 4/6/17 4/6/18  Sigrid Nolan MD   trazodone (DESYREL) 50 MG tablet Take 1 tablet (50 mg total) by mouth nightly as needed for Insomnia. 9/18/17 9/18/18  Sigird Nolan MD      Anticoagulants/Antiplatelets: no anticoagulation    Allergies: Review of patient's allergies indicates:  No Known Allergies  Sedation History:  no adverse reactions    Review of Systems:   Hematological: no known coagulopathies  Respiratory: no shortness of breath  Cardiovascular: no chest pain  Gastrointestinal: no abdominal pain  Genito-Urinary: no dysuria  Musculoskeletal: negative  Neurological: no TIA or stroke symptoms         OBJECTIVE:     Vital Signs (Most Recent)       Physical Exam:  ASA: 2  Mallampati: 2    General: no acute distress  Mental Status: alert and oriented to person, place and time  HEENT: normocephalic, atraumatic  Chest: unlabored breathing  Heart: regular heart rate  Abdomen: nondistended  Extremity: moves all extremities    Laboratory  Lab Results   Component Value Date    INR 1.3 (H) 10/05/2017       Lab Results   Component Value Date    WBC 4.30 10/05/2017    HGB 13.1 (L) 10/05/2017    HCT 39.2 (L) 10/05/2017    MCV 91 10/05/2017    PLT 98 (L) 10/05/2017      Lab Results   Component Value Date     10/05/2017     10/05/2017    K 5.1 10/05/2017     10/05/2017    CO2 28 10/05/2017    BUN 12 10/05/2017    CREATININE 0.8 10/05/2017    CALCIUM 9.2 10/05/2017    ALT 80 (H) 10/05/2017     (H) 10/05/2017    ALBUMIN 2.9 (L) 10/05/2017    BILITOT 2.9 (H) 10/05/2017    BILIDIR 1.4 (H) 10/05/2017       ASSESSMENT/PLAN:     Sedation Plan: Moderate sedation    Patient will undergo radioembolization.    Adam Friend MD  Radiology, PGY - II  [phone]

## 2017-10-05 NOTE — PROGRESS NOTES
Patient head elevated 45 degrees, no bleeding noted to groin site, patient AAO, drinking sprite with snack with no N/V

## 2017-10-05 NOTE — DISCHARGE SUMMARY
Radiology Discharge Summary      Hospital Course: No complications    Admit Date: 10/5/2017  Discharge Date: 10/05/2017     Instructions Given to Patient: Yes  Diet: Resume prior diet  Activity: activity as tolerated    Description of Condition on Discharge: Stable  Vital Signs (Most Recent): Temp: 97.9 °F (36.6 °C) (10/05/17 1310)  Pulse: (!) 59 (10/05/17 1800)  Resp: 16 (10/05/17 1800)  BP: (!) 130/50 (10/05/17 1800)  SpO2: 99 % (10/05/17 1800)    Discharge Disposition: Home    Discharge Diagnosis: HCC     Follow-up: IR Dept. (Dr. Michael Magallanes)    Adam Friend MD  Radiology, PGY - II  [phone]

## 2017-10-06 DIAGNOSIS — C22.0 HEPATOCELLULAR CARCINOMA: Primary | ICD-10-CM

## 2017-10-10 RX ORDER — ONDANSETRON 4 MG/1
4 TABLET, ORALLY DISINTEGRATING ORAL EVERY 8 HOURS PRN
Qty: 60 TABLET | Refills: 5 | Status: SHIPPED | OUTPATIENT
Start: 2017-10-10

## 2017-10-27 ENCOUNTER — TELEPHONE (OUTPATIENT)
Dept: HEPATOLOGY | Facility: CLINIC | Age: 57
End: 2017-10-27

## 2017-10-27 NOTE — TELEPHONE ENCOUNTER
Patient discontinued all fluid pills and now with volume overload.  Instructed to restart furosemide.  Will take 40mg x 3 days and then return to 20mg daily.  Labs with return appointment on 11/15/17.  Patient reports continuing low sodium diet.

## 2017-10-27 NOTE — TELEPHONE ENCOUNTER
Received call from the patient stating he feel and stomach are swelling. The patient asked if he had been taking his fluid pills. The patient Dr Nolan took me off of them with my last visit. I took a furosemide yesterday because of the swelling.  Patient informed that I will give Dr Nolan the information and get back with him. Voiced understanding. Dr Nolan in clinic and notified.

## 2017-10-30 ENCOUNTER — TELEPHONE (OUTPATIENT)
Dept: TRANSPLANT | Facility: CLINIC | Age: 57
End: 2017-10-30

## 2017-10-31 NOTE — TELEPHONE ENCOUNTER
"Noted    ----- Message from Kadie Andrews MA sent at 10/30/2017  7:44 AM CDT -----  Regarding: RE: post Y90 f/u    We dont have to see him in clinic.  Rosa M KIRBY will call him with his results.  But we schedule him in 3 months for MRI and IR clinic F/U.  Thanks    ----- Message -----  From: Brynn Trinidad  Sent: 10/28/2017   6:13 PM  To: Jessica Lara MA, Kadie Andrews MA, #  Subject: post Y90 f/u                                     Mr. Nowak was treated with Y90 on 10/5.  Follow-up plan says IR clinic w/labs in 1 month; repeat imaging in 3 months.    He"s scheduled for labs and f/u with Dr. Nolan on 11/15.  Are you able to schedule his IR f/u on the same day?      "

## 2017-11-08 DIAGNOSIS — C22.0 HEPATOCELLULAR CARCINOMA: Primary | ICD-10-CM

## 2017-11-10 ENCOUNTER — TELEPHONE (OUTPATIENT)
Dept: HEPATOLOGY | Facility: CLINIC | Age: 57
End: 2017-11-10

## 2017-11-10 NOTE — TELEPHONE ENCOUNTER
Received message from the patient that he would like a call. Please call [phone].    Patient called. He stated I know I have an appointment on Wed 11/15/17 to see Dr Nolan but I wanted to let her know what is going on.  I have these varices and they have been banded. Well I spit up some blood on yesterday and then it stopped. My stools are dark like liver.    Patient informed that he needs to go to the nearest emergency now to be examined. It is very important because you are passing blood from your mouth and your bottom.You need to go to the ER now.    Patient stated I will wait until I have another stool and if it is dark I will go. I will try to hold out till Wed. It was stressed to the patient to go to the ER now. Verbalized understanding.

## 2017-11-10 NOTE — TELEPHONE ENCOUNTER
----- Message from Jennifer Cuevas RN sent at 11/10/2017  1:30 PM CST -----  Contact: patient       ----- Message -----  From: Dania Rendon  Sent: 11/10/2017   1:23 PM  To: [facility]    Patient would like a call        Please call [phone]        Thanks!!!

## 2017-11-15 ENCOUNTER — OFFICE VISIT (OUTPATIENT)
Dept: HEPATOLOGY | Facility: CLINIC | Age: 57
End: 2017-11-15
Payer: MEDICAID

## 2017-11-15 ENCOUNTER — LAB VISIT (OUTPATIENT)
Dept: LAB | Facility: HOSPITAL | Age: 57
End: 2017-11-15
Payer: MEDICAID

## 2017-11-15 VITALS
WEIGHT: 184.31 LBS | HEART RATE: 61 BPM | DIASTOLIC BLOOD PRESSURE: 61 MMHG | OXYGEN SATURATION: 98 % | TEMPERATURE: 97 F | SYSTOLIC BLOOD PRESSURE: 147 MMHG | BODY MASS INDEX: 26.39 KG/M2 | HEIGHT: 70 IN

## 2017-11-15 DIAGNOSIS — K70.31 ALCOHOLIC CIRRHOSIS OF LIVER WITH ASCITES: Primary | ICD-10-CM

## 2017-11-15 DIAGNOSIS — C22.0 HCC (HEPATOCELLULAR CARCINOMA): ICD-10-CM

## 2017-11-15 DIAGNOSIS — C22.0 HEPATOCELLULAR CARCINOMA: Primary | ICD-10-CM

## 2017-11-15 DIAGNOSIS — C22.0 HEPATOCELLULAR CARCINOMA: ICD-10-CM

## 2017-11-15 LAB
AFP SERPL-MCNC: 2571 NG/ML
ALBUMIN SERPL BCP-MCNC: 2.2 G/DL
ALP SERPL-CCNC: 286 U/L
ALT SERPL W/O P-5'-P-CCNC: 111 U/L
ANION GAP SERPL CALC-SCNC: 6 MMOL/L
AST SERPL-CCNC: 201 U/L
BASOPHILS # BLD AUTO: 0.03 K/UL
BASOPHILS NFR BLD: 0.6 %
BILIRUB SERPL-MCNC: 5.4 MG/DL
BUN SERPL-MCNC: 18 MG/DL
CALCIUM SERPL-MCNC: 8.7 MG/DL
CHLORIDE SERPL-SCNC: 107 MMOL/L
CO2 SERPL-SCNC: 29 MMOL/L
CREAT SERPL-MCNC: 0.8 MG/DL
DIFFERENTIAL METHOD: ABNORMAL
EOSINOPHIL # BLD AUTO: 0.1 K/UL
EOSINOPHIL NFR BLD: 2.3 %
ERYTHROCYTE [DISTWIDTH] IN BLOOD BY AUTOMATED COUNT: 16 %
EST. GFR  (AFRICAN AMERICAN): >60 ML/MIN/1.73 M^2
EST. GFR  (NON AFRICAN AMERICAN): >60 ML/MIN/1.73 M^2
GLUCOSE SERPL-MCNC: 84 MG/DL
HCT VFR BLD AUTO: 37.4 %
HGB BLD-MCNC: 12.6 G/DL
IMM GRANULOCYTES # BLD AUTO: 0.01 K/UL
IMM GRANULOCYTES NFR BLD AUTO: 0.2 %
LYMPHOCYTES # BLD AUTO: 0.7 K/UL
LYMPHOCYTES NFR BLD: 14.5 %
MCH RBC QN AUTO: 29.8 PG
MCHC RBC AUTO-ENTMCNC: 33.7 G/DL
MCV RBC AUTO: 88 FL
MONOCYTES # BLD AUTO: 0.8 K/UL
MONOCYTES NFR BLD: 16.6 %
NEUTROPHILS # BLD AUTO: 3.2 K/UL
NEUTROPHILS NFR BLD: 65.8 %
NRBC BLD-RTO: 0 /100 WBC
PLATELET # BLD AUTO: 187 K/UL
PMV BLD AUTO: 10.9 FL
POTASSIUM SERPL-SCNC: 4.8 MMOL/L
PROT SERPL-MCNC: 6.7 G/DL
RBC # BLD AUTO: 4.23 M/UL
SODIUM SERPL-SCNC: 142 MMOL/L
WBC # BLD AUTO: 4.88 K/UL

## 2017-11-15 PROCEDURE — 80053 COMPREHEN METABOLIC PANEL: CPT

## 2017-11-15 PROCEDURE — 99215 OFFICE O/P EST HI 40 MIN: CPT | Mod: S$PBB,,, | Performed by: INTERNAL MEDICINE

## 2017-11-15 PROCEDURE — 99213 OFFICE O/P EST LOW 20 MIN: CPT | Mod: PBBFAC | Performed by: INTERNAL MEDICINE

## 2017-11-15 PROCEDURE — 85025 COMPLETE CBC W/AUTO DIFF WBC: CPT

## 2017-11-15 PROCEDURE — 99999 PR PBB SHADOW E&M-EST. PATIENT-LVL III: CPT | Mod: PBBFAC,,, | Performed by: INTERNAL MEDICINE

## 2017-11-15 PROCEDURE — 36415 COLL VENOUS BLD VENIPUNCTURE: CPT

## 2017-11-15 PROCEDURE — 82105 ALPHA-FETOPROTEIN SERUM: CPT

## 2017-11-15 RX ORDER — BISACODYL 5 MG
10 TABLET, DELAYED RELEASE (ENTERIC COATED) ORAL
Status: ON HOLD | COMMUNITY
End: 2017-11-29 | Stop reason: HOSPADM

## 2017-11-15 RX ORDER — NADOLOL 40 MG/1
20 TABLET ORAL DAILY
Status: ON HOLD | COMMUNITY
Start: 2017-10-06 | End: 2017-11-29

## 2017-11-15 NOTE — LETTER
November 16, 2017        Benji Moyer MD  [id number] [address] [facility]  [address]  [facility] [address]             [facility]  [address]  [place] [address]  Phone: [phone]  Fax: [phone]   Patient: Martin Houston   MR Number: [id number]   YOB: 1960   Date of Visit: 11/15/2017       Dear Dr. Moyer:    Thank you for referring Martin Houston to me for evaluation. Attached you will find relevant portions of my assessment and plan of care.    If you have questions, please do not hesitate to call me. I look forward to following Martin Houston along with you.    Sincerely,      Sigrid Nolan MD            CC  No Recipients    Enclosure

## 2017-11-15 NOTE — Clinical Note
Please inform patient that CBC shows stable blood counts despite recent bleeding.  He should follow up with Dr. Moyer regarding possible EGD as discussed in clinic.

## 2017-11-15 NOTE — PROGRESS NOTES
Hepatology Clinic Follow-up Visit       Original Referring Provider: Benji Moyer  Current Corresponding Physician: Benji MENESES Native Liver Diagnosis: Primary Liver Malignancy: Hepatoma (HCC) and Cirrhosis    Reason for Visit: HCC management      Subjective:     Martin Houston is a 57 y.o. 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  The patient is accompanied by his aunt.    Patient last seen in clinic on 6/27/2017.  Since that time the patient has undergone a second Y-90 treatment for HCC outside of [place].  More recently he has had increasing signs of decompensation.  Reports mild oral bleeding and melena last week.  No current evidence of bleeding.  Increasing volume overload and currently taking furosemide 20mg twice daily.  He had previously discontinued diuretic therapy due to misunderstanding of recommendations to hold spironolactone due to hyperkalemia.  He is also experiencing increasing abdominal pain and worsening of bilateral inguinal hernia.       The patient is having increasing difficulty working.  He continues to work 3-4 hours per day.  Unsure whether to seek disability.      PMH:   Alcoholic and hepatitis C cirrhosis  HCC    PSH:  No abdominal surgeries;  Right knee surgery     FH: no family history of liver disease    SH:  No current alcohol consumption, continuing to smoke tobacco, no illicit drug use     Review of Systems   Constitutional: Positive for activity change and fatigue. Negative for appetite change, chills and unexpected weight change.   HENT: Negative for hearing loss and sore throat.    Eyes: Negative for visual disturbance.   Respiratory: Negative for shortness of breath.    Cardiovascular: Positive for leg swelling. Negative for chest pain.   Gastrointestinal: Positive for abdominal distention, abdominal pain and constipation. Negative for blood in stool, nausea and vomiting.   Musculoskeletal: Positive for  back pain. Negative for gait problem.   Skin: Negative for rash.   Neurological: Negative for weakness and headaches.   Hematological: Negative for adenopathy. Does not bruise/bleed easily.   Psychiatric/Behavioral: Negative for confusion and decreased concentration.     Current Outpatient Prescriptions on File Prior to Visit   Medication Sig Dispense Refill    ascorbic acid, vitamin C, (VITAMIN C) 500 MG tablet Take 500 mg by mouth once daily.      ferrous sulfate 325 mg (65 mg iron) Tab tablet Take 325 mg by mouth 2 (two) times daily.      furosemide (LASIX) 20 MG tablet Take 1 tablet (20 mg total) by mouth once daily. 30 tablet 11    ibuprofen (ADVIL,MOTRIN) 200 MG tablet Take 200 mg by mouth once daily.      multivitamin-zinc gluconate (SOURCECF PED VITAMIN WITH ZINC) 5 mg/mL Drop Take by mouth.      omeprazole (PRILOSEC) 20 MG capsule Take 20 mg by mouth once daily.      ondansetron (ZOFRAN-ODT) 4 MG TbDL Take 1 tablet (4 mg total) by mouth every 8 (eight) hours as needed. 60 tablet 5    polyethylene glycol (GLYCOLAX) 17 gram PwPk Take 17 g by mouth once daily.      spironolactone (ALDACTONE) 50 MG tablet Take 1 tablet (50 mg total) by mouth once daily. (Patient taking differently: Take 50 mg by mouth once daily. ON  HOLD AS OF 05/22/2017) 30 tablet 11    trazodone (DESYREL) 50 MG tablet Take 1 tablet (50 mg total) by mouth nightly as needed for Insomnia. 30 tablet 11     No current facility-administered medications on file prior to visit.          Objective:   Physical Exam   Constitutional: He is oriented to person, place, and time. He appears well-developed and well-nourished. No distress.   HENT:   Head: Normocephalic and atraumatic.   Mouth/Throat: Oropharynx is clear and moist. No oropharyngeal exudate.   Eyes: Conjunctivae are normal. Pupils are equal, round, and reactive to light. No scleral icterus.   Neck: Normal range of motion. Neck supple. No thyromegaly present.   Cardiovascular: Normal  rate, regular rhythm and normal heart sounds.  Exam reveals no gallop and no friction rub.    No murmur heard.  Pulmonary/Chest: Effort normal and breath sounds normal. No respiratory distress. He has no wheezes. He has no rales.   Abdominal: Soft. Bowel sounds are normal. He exhibits no distension. There is no tenderness. There is no rebound and no guarding.   Musculoskeletal: Normal range of motion. He exhibits edema (mild to moderate).   Lymphadenopathy:     He has no cervical adenopathy.   Neurological: He is alert and oriented to person, place, and time. No cranial nerve deficit.   Skin: Skin is warm and dry. No erythema.   Psychiatric: He has a normal mood and affect. His behavior is normal.   Vitals reviewed.    MELD-Na score: 13 at 10/5/2017  9:13 AM  MELD score: 13 at 10/5/2017  9:13 AM  Calculated from:  Serum Creatinine: 0.8 mg/dL (Rounded to 1) at 10/5/2017  9:13 AM  Serum Sodium: 143 mmol/L (Rounded to 137) at 10/5/2017  9:13 AM  Total Bilirubin: 2.9 mg/dL at 10/5/2017  9:13 AM  INR(ratio): 1.3 at 10/5/2017  9:13 AM  Age: 56 years     Lab Results   Component Value Date    GLU 84 11/15/2017    BUN 18 11/15/2017    CREATININE 0.8 11/15/2017    CALCIUM 8.7 11/15/2017     11/15/2017    K 4.8 11/15/2017     11/15/2017    PROT 6.7 11/15/2017    CO2 29 11/15/2017    WBC 4.30 10/05/2017    RBC 4.29 (L) 10/05/2017    HGB 13.1 (L) 10/05/2017    HCT 39.2 (L) 10/05/2017    PLT 98 (L) 10/05/2017     Lab Results   Component Value Date    CHOL 139 04/06/2017    TRIG 35 04/06/2017    HDL 52 04/06/2017    CHOLHDL 37.4 04/06/2017    TOTALCHOLEST 2.7 04/06/2017    ALBUMIN 2.2 (L) 11/15/2017    BILITOT 5.4 (H) 11/15/2017     (H) 11/15/2017     (H) 11/15/2017    ALKPHOS 286 (H) 11/15/2017    LABPROT 13.0 (H) 10/05/2017    INR 1.3 (H) 10/05/2017       Diagnostics: EMR reviewed      Assessment/Plan:   56yo male with decompensated hepatitis C and alcoholic cirrhosis complicated by HCC.  Patient is  experiencing worsening decompensation in the setting of recent Y-90 treatment for multifocal HCC.    Transplant candidacy:  Patient is not transplant candidate at this time due to extent of malignancy.  Initial tumor burden with 8.0cm lesion and concern for infiltrative disease.      HCC:  Patient has undergone Y-90 x 2 (5/2017, 10/2017).  Due for surveillance imaging in early January 2018.       Volume overload: Increasing low extremity edema and ascites.  Patient discontinued diuretics due to misunderstanding of recommendations.  He has restarted lasix 20mg twice daily.  Instructed to consolidate dosing to 40mg daily.  Patient with normal renal function at this time and appears to be tolerating current dosing.  He will follow daily weights and monitor fluid status, if not improved with current dosing will increase as tolerated.  Continue holding spironolactone for K > 4.      Melena:  CBC added to labs today.  Stable at 12.6.  Recent bleeding appears to be limited but patient instructed to f/u with Dr. Moyer for consideration of EGD to rule out UGI bleeding source that requires endoscopic management.  Discussed importance of urgent medication evaluation if clinically significant bleeding occurs.      Constipation:  Patient reports baseline constipation and has been taking dulcolax and miralax with incontinence following treatment.  Recommend to limit treatment to miralax as needed and only use dulcolax if there is no response to multiple doses of miralax.      Disability:  Patient is having more difficulty continuing full time employment with worsening of his liver disease.  There is evidence that his liver disease is progressing in the setting of radioembolization for therapy of extensive HCC.  If patient decides to pursue disability, instructed to provide forms to disability department and this will be appropriately completed.  It is reasonable to pursue disability based on the current status of his liver  disease and cancer.      RTC in approximately 6-8 weeks     Sigrid Nolan MD     [facility] - [phone] Luis Alberto (may call if you can't reach patient)      [employer] Patient Status  Functional Status: 60% - Requires occasional assistance but is able to care for needs  Physical Capacity: No Limitations    Outside Records Request: none

## 2017-11-15 NOTE — PATIENT INSTRUCTIONS
Consolidate lasix to 40mg daily.  Take this in the early afternoon after work but before bed.  Follow a low sodium diet.     You should take trazodone for sleep as needed.      Please link a CBC to this labs today. (ordered by [facility])    If you need to proceed with disability, we can fill out forms as provided.    You should proceed with EGD with Dr. Moyer locally.    Return to clinic in 6-8 weeks.

## 2017-11-16 ENCOUNTER — TELEPHONE (OUTPATIENT)
Dept: HEPATOLOGY | Facility: CLINIC | Age: 57
End: 2017-11-16

## 2017-11-16 NOTE — TELEPHONE ENCOUNTER
Patient called back, inform patient him of his results below. He is scheduled to see Dr. Moyer on 11/29. YUSUF

## 2017-11-16 NOTE — TELEPHONE ENCOUNTER
----- Message from Sigrid Nolan MD sent at 11/16/2017 12:59 PM CST -----  Please inform patient that CBC shows stable blood counts despite recent bleeding.  He should follow up with Dr. Moyer regarding possible EGD as discussed in clinic.

## 2017-11-27 ENCOUNTER — HOSPITAL ENCOUNTER (INPATIENT)
Facility: HOSPITAL | Age: 57
LOS: 2 days | Discharge: HOME | DRG: 433 | End: 2017-11-29
Attending: EMERGENCY MEDICINE | Admitting: INTERNAL MEDICINE
Payer: MEDICAID

## 2017-11-27 DIAGNOSIS — N17.9 ACUTE RENAL FAILURE, UNSPECIFIED ACUTE RENAL FAILURE TYPE: ICD-10-CM

## 2017-11-27 DIAGNOSIS — R06.02 SHORTNESS OF BREATH: ICD-10-CM

## 2017-11-27 DIAGNOSIS — C22.0 HCC (HEPATOCELLULAR CARCINOMA): ICD-10-CM

## 2017-11-27 DIAGNOSIS — Z85.05 HISTORY OF HEPATOCELLULAR CARCINOMA: ICD-10-CM

## 2017-11-27 DIAGNOSIS — K72.10 CHRONIC LIVER FAILURE WITHOUT HEPATIC COMA: ICD-10-CM

## 2017-11-27 DIAGNOSIS — R19.5 OCCULT BLOOD POSITIVE STOOL: ICD-10-CM

## 2017-11-27 DIAGNOSIS — K70.31 ALCOHOLIC CIRRHOSIS OF LIVER WITH ASCITES: ICD-10-CM

## 2017-11-27 DIAGNOSIS — K70.31 ASCITES DUE TO ALCOHOLIC CIRRHOSIS: Primary | ICD-10-CM

## 2017-11-27 PROBLEM — K59.09 CONSTIPATION, CHRONIC: Status: ACTIVE | Noted: 2017-11-27

## 2017-11-27 PROBLEM — R60.0 BILATERAL LOWER EXTREMITY EDEMA: Status: ACTIVE | Noted: 2017-11-27

## 2017-11-27 LAB
ALBUMIN SERPL BCP-MCNC: 1.9 G/DL
ALP SERPL-CCNC: 356 U/L
ALT SERPL W/O P-5'-P-CCNC: 141 U/L
AMORPH CRY URNS QL MICRO: ABNORMAL
ANION GAP SERPL CALC-SCNC: 11 MMOL/L
APPEARANCE FLD: CLEAR
APTT BLDCRRT: 29.6 SEC
AST SERPL-CCNC: 281 U/L
BACTERIA #/AREA URNS HPF: ABNORMAL /HPF
BASOPHILS # BLD AUTO: 0.04 K/UL
BASOPHILS NFR BLD: 0.6 %
BILIRUB SERPL-MCNC: 13.5 MG/DL
BILIRUB UR QL STRIP: ABNORMAL
BODY FLD TYPE: ABNORMAL
BUN SERPL-MCNC: 46 MG/DL
CALCIUM SERPL-MCNC: 9 MG/DL
CHLORIDE SERPL-SCNC: 95 MMOL/L
CLARITY UR: ABNORMAL
CO2 SERPL-SCNC: 30 MMOL/L
COLOR FLD: YELLOW
COLOR UR: ABNORMAL
CREAT SERPL-MCNC: 2.5 MG/DL
DIFFERENTIAL METHOD: ABNORMAL
EOSINOPHIL # BLD AUTO: 0.1 K/UL
EOSINOPHIL NFR BLD: 1.6 %
ERYTHROCYTE [DISTWIDTH] IN BLOOD BY AUTOMATED COUNT: 17.7 %
EST. GFR  (AFRICAN AMERICAN): 31 ML/MIN/1.73 M^2
EST. GFR  (NON AFRICAN AMERICAN): 27 ML/MIN/1.73 M^2
GLUCOSE SERPL-MCNC: 85 MG/DL
GLUCOSE UR QL STRIP: ABNORMAL
HCT VFR BLD AUTO: 35.1 %
HGB BLD-MCNC: 12.4 G/DL
HGB UR QL STRIP: ABNORMAL
HYALINE CASTS #/AREA URNS LPF: 14 /LPF
INR PPP: 1.5
KETONES UR QL STRIP: ABNORMAL
LEUKOCYTE ESTERASE UR QL STRIP: ABNORMAL
LIPASE SERPL-CCNC: 26 U/L
LYMPHOCYTES # BLD AUTO: 0.9 K/UL
LYMPHOCYTES NFR BLD: 13.7 %
LYMPHOCYTES NFR FLD MANUAL: 31 %
MCH RBC QN AUTO: 29.6 PG
MCHC RBC AUTO-ENTMCNC: 35.3 G/DL
MCV RBC AUTO: 84 FL
MICROSCOPIC COMMENT: ABNORMAL
MONOCYTES # BLD AUTO: 1 K/UL
MONOCYTES NFR BLD: 15.8 %
MONOS+MACROS NFR FLD MANUAL: 65 %
NEUTROPHILS # BLD AUTO: 4.2 K/UL
NEUTROPHILS NFR BLD: 68.3 %
NEUTROPHILS NFR FLD MANUAL: 2 %
NITRITE UR QL STRIP: ABNORMAL
OB PNL STL: POSITIVE
OTHER CELLS FLD MANUAL: 2 %
PH UR STRIP: ABNORMAL [PH] (ref 5–8)
PLATELET # BLD AUTO: 220 K/UL
PMV BLD AUTO: 9.9 FL
POTASSIUM SERPL-SCNC: 4.6 MMOL/L
PROT SERPL-MCNC: 6.6 G/DL
PROT UR QL STRIP: ABNORMAL
PROTHROMBIN TIME: 15.2 SEC
RBC # BLD AUTO: 4.19 M/UL
RBC #/AREA URNS HPF: 2 /HPF (ref 0–4)
SODIUM SERPL-SCNC: 136 MMOL/L
SODIUM UR-SCNC: <20 MMOL/L
SP GR UR STRIP: ABNORMAL (ref 1–1.03)
URN SPEC COLLECT METH UR: ABNORMAL
UROBILINOGEN UR STRIP-ACNC: ABNORMAL EU/DL
WBC # BLD AUTO: 6.19 K/UL
WBC # FLD: 163 /CU MM
WBC #/AREA URNS HPF: 33 /HPF (ref 0–5)
WBC CLUMPS URNS QL MICRO: ABNORMAL

## 2017-11-27 PROCEDURE — 85610 PROTHROMBIN TIME: CPT

## 2017-11-27 PROCEDURE — 87040 BLOOD CULTURE FOR BACTERIA: CPT

## 2017-11-27 PROCEDURE — 85730 THROMBOPLASTIN TIME PARTIAL: CPT

## 2017-11-27 PROCEDURE — 0W9G3ZX DRAINAGE OF PERITONEAL CAVITY, PERCUTANEOUS APPROACH, DIAGNOSTIC: ICD-10-PCS | Performed by: RADIOLOGY

## 2017-11-27 PROCEDURE — 87070 CULTURE OTHR SPECIMN AEROBIC: CPT

## 2017-11-27 PROCEDURE — 85025 COMPLETE CBC W/AUTO DIFF WBC: CPT

## 2017-11-27 PROCEDURE — 63600175 PHARM REV CODE 636 W HCPCS: Performed by: PHYSICIAN ASSISTANT

## 2017-11-27 PROCEDURE — 99232 SBSQ HOSP IP/OBS MODERATE 35: CPT | Mod: ,,, | Performed by: INTERNAL MEDICINE

## 2017-11-27 PROCEDURE — 93010 ELECTROCARDIOGRAM REPORT: CPT | Mod: ,,, | Performed by: INTERNAL MEDICINE

## 2017-11-27 PROCEDURE — P9047 ALBUMIN (HUMAN), 25%, 50ML: HCPCS | Performed by: PHYSICIAN ASSISTANT

## 2017-11-27 PROCEDURE — 99285 EMERGENCY DEPT VISIT HI MDM: CPT

## 2017-11-27 PROCEDURE — 89051 BODY FLUID CELL COUNT: CPT

## 2017-11-27 PROCEDURE — 83690 ASSAY OF LIPASE: CPT

## 2017-11-27 PROCEDURE — 87205 SMEAR GRAM STAIN: CPT

## 2017-11-27 PROCEDURE — 36430 TRANSFUSION BLD/BLD COMPNT: CPT

## 2017-11-27 PROCEDURE — 81000 URINALYSIS NONAUTO W/SCOPE: CPT

## 2017-11-27 PROCEDURE — 82272 OCCULT BLD FECES 1-3 TESTS: CPT

## 2017-11-27 PROCEDURE — 96360 HYDRATION IV INFUSION INIT: CPT

## 2017-11-27 PROCEDURE — 25000003 PHARM REV CODE 250: Performed by: EMERGENCY MEDICINE

## 2017-11-27 PROCEDURE — 80053 COMPREHEN METABOLIC PANEL: CPT

## 2017-11-27 PROCEDURE — 93005 ELECTROCARDIOGRAM TRACING: CPT

## 2017-11-27 PROCEDURE — 84300 ASSAY OF URINE SODIUM: CPT

## 2017-11-27 PROCEDURE — 63600175 PHARM REV CODE 636 W HCPCS: Performed by: EMERGENCY MEDICINE

## 2017-11-27 PROCEDURE — 21400001 HC TELEMETRY ROOM

## 2017-11-27 PROCEDURE — 49082 ABD PARACENTESIS: CPT

## 2017-11-27 PROCEDURE — P9047 ALBUMIN (HUMAN), 25%, 50ML: HCPCS | Performed by: EMERGENCY MEDICINE

## 2017-11-27 RX ORDER — TRAZODONE HYDROCHLORIDE 50 MG/1
50 TABLET ORAL NIGHTLY PRN
Status: DISCONTINUED | OUTPATIENT
Start: 2017-11-27 | End: 2017-11-29 | Stop reason: HOSPADM

## 2017-11-27 RX ORDER — POLYETHYLENE GLYCOL 3350 17 G/17G
17 POWDER, FOR SOLUTION ORAL DAILY
Status: DISCONTINUED | OUTPATIENT
Start: 2017-11-28 | End: 2017-11-29 | Stop reason: HOSPADM

## 2017-11-27 RX ORDER — FERROUS SULFATE 325(65) MG
325 TABLET, DELAYED RELEASE (ENTERIC COATED) ORAL DAILY
Status: DISCONTINUED | OUTPATIENT
Start: 2017-11-28 | End: 2017-11-29 | Stop reason: HOSPADM

## 2017-11-27 RX ORDER — BISACODYL 5 MG
5 TABLET, DELAYED RELEASE (ENTERIC COATED) ORAL 2 TIMES DAILY
Status: DISCONTINUED | OUTPATIENT
Start: 2017-11-27 | End: 2017-11-29 | Stop reason: HOSPADM

## 2017-11-27 RX ORDER — NADOLOL 20 MG/1
20 TABLET ORAL DAILY
Status: DISCONTINUED | OUTPATIENT
Start: 2017-11-28 | End: 2017-11-29 | Stop reason: HOSPADM

## 2017-11-27 RX ORDER — SODIUM CHLORIDE 9 MG/ML
1000 INJECTION, SOLUTION INTRAVENOUS
Status: COMPLETED | OUTPATIENT
Start: 2017-11-27 | End: 2017-11-27

## 2017-11-27 RX ORDER — PANTOPRAZOLE SODIUM 40 MG/1
40 TABLET, DELAYED RELEASE ORAL DAILY
Status: DISCONTINUED | OUTPATIENT
Start: 2017-11-28 | End: 2017-11-29 | Stop reason: HOSPADM

## 2017-11-27 RX ORDER — ALBUMIN HUMAN 250 G/1000ML
25 SOLUTION INTRAVENOUS EVERY 8 HOURS
Status: DISCONTINUED | OUTPATIENT
Start: 2017-11-27 | End: 2017-11-29 | Stop reason: HOSPADM

## 2017-11-27 RX ORDER — ALBUMIN HUMAN 250 G/1000ML
25 SOLUTION INTRAVENOUS ONCE
Status: COMPLETED | OUTPATIENT
Start: 2017-11-27 | End: 2017-11-27

## 2017-11-27 RX ORDER — POLYETHYLENE GLYCOL 3350 17 G/17G
17 POWDER, FOR SOLUTION ORAL DAILY
Status: DISCONTINUED | OUTPATIENT
Start: 2017-11-28 | End: 2017-11-27 | Stop reason: SDUPTHER

## 2017-11-27 RX ADMIN — SODIUM CHLORIDE 1000 ML: 0.9 INJECTION, SOLUTION INTRAVENOUS at 01:11

## 2017-11-27 RX ADMIN — SODIUM CHLORIDE 500 ML: 0.9 INJECTION, SOLUTION INTRAVENOUS at 11:11

## 2017-11-27 RX ADMIN — ALBUMIN HUMAN 25 G: 0.25 SOLUTION INTRAVENOUS at 10:11

## 2017-11-27 RX ADMIN — SODIUM CHLORIDE 500 ML: 0.9 INJECTION, SOLUTION INTRAVENOUS at 12:11

## 2017-11-27 RX ADMIN — ALBUMIN HUMAN 25 G: 0.25 SOLUTION INTRAVENOUS at 03:11

## 2017-11-27 NOTE — HPI
The patient presented to the ER for abdominal pain and increased swelling. The patient has a history of decompensated hepatitis C and alcoholic cirrhosis complicated by HCC. He is being followed by Hepatology in [place]. He has received two treatments of Y-90 treatment for multifocal HCC. He reports sharp lower abdominal pain, decreased appetite and nausea for four days. He denies vomiting or hematemesis. He also reports epigastric tenderness. He has chronic constipation and hasn't had a good BM in several days. He also reports swelling in the lower extremities. His labs show increased LFTs, INR and worsening renal function. He has been taking Lasix 40 mg daily for the last two weeks. CT scan showed a large amount of ascites, but no other acute issues. He has never had a paracentesis. He is hypotensive. Afebrile and normal WBC count. Hgb stable. UA pending.

## 2017-11-27 NOTE — ASSESSMENT & PLAN NOTE
-Due to third spacing from hypoalbuminemia associated with liver disease and malnutrition.   -Albumin may cause transient shift.   -Continue supportive care.

## 2017-11-27 NOTE — SUBJECTIVE & OBJECTIVE
Past Medical History:   Diagnosis Date    Anemia     Bone spur of foot     patient bone spurs removed    Cirrhosis     Fracture     Right tibia/fibula    Gallstones     GERD (gastroesophageal reflux disease)     Hernia     patient reports 2 herina in the groin area    Personal history of kidney stones        Past Surgical History:   Procedure Laterality Date    TONSILLECTOMY         Review of patient's allergies indicates:  No Known Allergies    No current facility-administered medications on file prior to encounter.      Current Outpatient Prescriptions on File Prior to Encounter   Medication Sig    ascorbic acid, vitamin C, (VITAMIN C) 500 MG tablet Take 500 mg by mouth once daily.    ferrous sulfate 325 mg (65 mg iron) Tab tablet Take 325 mg by mouth 3 (three) times daily.     furosemide (LASIX) 20 MG tablet Take 1 tablet (20 mg total) by mouth once daily.    multivitamin-zinc gluconate (SOURCECF PED VITAMIN WITH ZINC) 5 mg/mL Drop Take by mouth.    nadolol (CORGARD) 40 MG tablet Take 20 mg by mouth once daily.     omeprazole (PRILOSEC) 20 MG capsule Take 20 mg by mouth once daily.    polyethylene glycol (GLYCOLAX) 17 gram PwPk Take 17 g by mouth once daily.    spironolactone (ALDACTONE) 50 MG tablet Take 1 tablet (50 mg total) by mouth once daily. (Patient taking differently: Take 50 mg by mouth once daily. ON  HOLD AS OF 05/22/2017)    trazodone (DESYREL) 50 MG tablet Take 1 tablet (50 mg total) by mouth nightly as needed for Insomnia.    bisacodyl (DULCOLAX) 5 mg EC tablet Take 10 mg by mouth.    ibuprofen (ADVIL,MOTRIN) 200 MG tablet Take 200 mg by mouth once daily.    ondansetron (ZOFRAN-ODT) 4 MG TbDL Take 1 tablet (4 mg total) by mouth every 8 (eight) hours as needed.     Family History     Problem Relation (Age of Onset)    Cancer Mother    Hypertension Father    Stroke Father        Social History Main Topics    Smoking status: Current Every Day Smoker     Packs/day: 1.00     Years:  45.00     Types: Cigarettes    Smokeless tobacco: Never Used      Comment: Patient was enrolled in the smoking cessation program at  [facility].    Alcohol use No      Comment: stopped drinking 15 years ago    Drug use: No      Comment: stopped 15 years ago ( Cocaine and marijuana))    Sexual activity: Not on file     Review of Systems   Constitutional: Positive for appetite change (decreased). Negative for chills, diaphoresis, fatigue and fever.   HENT: Negative for congestion, ear pain, mouth sores, sore throat and trouble swallowing.    Eyes: Negative for pain and visual disturbance.   Respiratory: Negative for cough, chest tightness and shortness of breath.    Cardiovascular: Positive for leg swelling (bilateral). Negative for chest pain and palpitations.   Gastrointestinal: Positive for abdominal pain and constipation. Negative for diarrhea and nausea.   Endocrine: Negative for cold intolerance, heat intolerance, polydipsia and polyuria.   Genitourinary: Negative for dysuria, frequency and hematuria.   Musculoskeletal: Negative for arthralgias, back pain, myalgias and neck pain.   Skin: Negative for pallor, rash and wound.   Allergic/Immunologic: Negative for environmental allergies and immunocompromised state.   Neurological: Negative for dizziness, seizures, syncope, weakness, numbness and headaches.   Hematological: Negative for adenopathy. Does not bruise/bleed easily.   Psychiatric/Behavioral: Negative for agitation, confusion and sleep disturbance.     Objective:     Vital Signs (Most Recent):  Temp: 97.9 °F (36.6 °C) (11/27/17 1015)  Pulse: 62 (11/27/17 1554)  Resp: 19 (11/27/17 1554)  BP: (!) 95/35 (11/27/17 1554)  SpO2: 98 % (11/27/17 1554) Vital Signs (24h Range):  Temp:  [97.9 °F (36.6 °C)] 97.9 °F (36.6 °C)  Pulse:  [56-70] 62  Resp:  [15-20] 19  SpO2:  [95 %-99 %] 98 %  BP: ()/(35-63) 95/35     Weight: 82.5 kg (181 lb 14.1 oz)  Body mass index is 26.1 kg/m².    Physical Exam    Constitutional: He is oriented to person, place, and time. He appears well-developed. He appears ill.   HENT:   Head: Normocephalic and atraumatic.   Eyes: Pupils are equal, round, and reactive to light. Scleral icterus is present.   Neck: Neck supple. No JVD present.   Cardiovascular: Normal rate, regular rhythm and normal heart sounds.    Pulmonary/Chest: Effort normal and breath sounds normal. He has no wheezes.   Abdominal: Soft. Bowel sounds are normal. He exhibits fluid wave and ascites. There is generalized tenderness.   Musculoskeletal: Normal range of motion.        Right shoulder: He exhibits swelling (+2 bilateral lower extremity ).   Neurological: He is alert and oriented to person, place, and time.   Skin: Skin is warm and dry. No rash noted.   Psychiatric: He has a normal mood and affect. His behavior is normal. Thought content normal.   Nursing note and vitals reviewed.       Significant Labs:   CBC:   Recent Labs  Lab 11/27/17  1120   WBC 6.19   HGB 12.4*   HCT 35.1*        CMP:   Recent Labs  Lab 11/27/17  1120      K 4.6   CL 95   CO2 30*   GLU 85   BUN 46*   CREATININE 2.5*   CALCIUM 9.0   PROT 6.6   ALBUMIN 1.9*   BILITOT 13.5*   ALKPHOS 356*   *   *   ANIONGAP 11   EGFRNONAA 27*     Lactic Acid: No results for input(s): LACTATE in the last 48 hours.  All pertinent labs within the past 24 hours have been reviewed.    Significant Imaging: I have reviewed all pertinent imaging results/findings within the past 24 hours.   Imaging Results          X-Ray Chest PA And Lateral (Final result)  Result time 11/27/17 16:44:48    Final result by Vilma Sung MD (11/27/17 16:44:48)                 Impression:      Small left pleural effusion.      Electronically signed by: VILMA SUNG MD  Date:     11/27/17  Time:    16:44              Narrative:    Exam: XR CHEST PA AND LATERAL,    Date:  11/27/17 16:33:02    History: Pleural effusion.    Comparison:  No prior  relevant studies available    Findings: Heart size is normal.    Lung fields appear clear.  There is blunting of the left costophrenic angle suggestive of a small left pleural effusion.                             CT Abdomen Pelvis  Without Contrast (Final result)  Result time 11/27/17 14:32:42    Final result by Vilma Sung MD (11/27/17 14:32:42)                 Impression:     Evidence of hepatic cirrhosis with liver mass, slightly decreased in size allowing for the difference in technique as compared to previous 08/28/2017.  Splenomegaly with esophageal and gastric varices.  Large volume abdominal ascites, new as compared to previous study.    Small left pleural effusion, also new.    Cholelithiasis with nonspecific gallbladder wall thickening.    Nonobstructing left nephrolithiasis.    Large right small bowel containing femoral hernia without obvious associated obstruction.    Fluid-filled left inguinal hernia.    No evidence of constipation/impaction.      All CT scans at this facility use automated dose modulation, iterative reconstruction and/or weight based dosing techniques when appropriate to reduce radiation dose to as low as reasonably achievable.       Electronically signed by: VILMA SUNG MD  Date:     11/27/17  Time:    14:32              Narrative:    CT ABDOMEN PELVIS WITHOUT CONTRAST,     Date:  11/27/17 13:47:01    Technique: Limited noncontrast CT scan of the abdomen and pelvis.    Comparison: 08/28/2017.    History:  constipation,     Findings:  Small left pleural effusion has developed with left lingular and left lower lobe discoid atelectasis.    The liver is abnormal with evidence of cirrhosis.  A focal right hepatic lobe lesion appears slightly smaller allowing for differences in technique as compared to the previous study.  Post therapy changes within the adjacent liver parenchyma are noted.    The spleen is enlarged, slightly decreased in size as compared to the previous  study, currently measuring 15.3 cm whereas it previously measured 17.1 cm.  The gallbladder demonstrates wall thickening with several calcified gallstones.    Several gastric/esophageal varices are noted.    Right kidney is grossly normal.    A nonobstructing 5.4 mm calcification of the midpole of the left kidney is noted.  A similar calcification in the lower pole approximately 6.8 mm in size is noted.    Since the prior study a large volume of abdominal ascites has developed.     Within the left groin a fluid filled single hernia is present.  The right groin a fat and small bowel containing hernia is present.  This appears larger than on the previous study.  No associated bowel obstruction.    The large bowel is grossly unremarkable without evidence of obvious impaction.    No additional findings.    Mild thoracolumbar scoliosis and spondylosis.                             X-Ray Abdomen Flat And Erect (Final result)  Result time 11/27/17 11:25:29    Final result by Corona Ascencio III, MD (11/27/17 11:25:29)                 Impression:     As above.  No free air.  No definite obstruction.      Electronically signed by: CORONA ASCENCIO MD  Date:     11/27/17  Time:    11:25              Narrative:    Abdomen series, 2 views.    Clinical indication: Abdominal pain.    Mild gaseous filling of numerous small bowel loops with minimal gastric and colonic gas.  The overall pattern is nonspecific and may be related to an adynamic ileus.  No free air.  Consider followup studies as indicated.

## 2017-11-27 NOTE — H&P (VIEW-ONLY)
Hepatology Clinic Follow-up Visit       Original Referring Provider: Benji Moyer  Current Corresponding Physician: Benji MENESES Native Liver Diagnosis: Primary Liver Malignancy: Hepatoma (HCC) and Cirrhosis    Reason for Visit: HCC management      Subjective:     Martin Houston is a 57 y.o. 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  The patient is accompanied by his aunt.    Patient last seen in clinic on 6/27/2017.  Since that time the patient has undergone a second Y-90 treatment for HCC outside of [place].  More recently he has had increasing signs of decompensation.  Reports mild oral bleeding and melena last week.  No current evidence of bleeding.  Increasing volume overload and currently taking furosemide 20mg twice daily.  He had previously discontinued diuretic therapy due to misunderstanding of recommendations to hold spironolactone due to hyperkalemia.  He is also experiencing increasing abdominal pain and worsening of bilateral inguinal hernia.       The patient is having increasing difficulty working.  He continues to work 3-4 hours per day.  Unsure whether to seek disability.      PMH:   Alcoholic and hepatitis C cirrhosis  HCC    PSH:  No abdominal surgeries;  Right knee surgery     FH: no family history of liver disease    SH:  No current alcohol consumption, continuing to smoke tobacco, no illicit drug use     Review of Systems   Constitutional: Positive for activity change and fatigue. Negative for appetite change, chills and unexpected weight change.   HENT: Negative for hearing loss and sore throat.    Eyes: Negative for visual disturbance.   Respiratory: Negative for shortness of breath.    Cardiovascular: Positive for leg swelling. Negative for chest pain.   Gastrointestinal: Positive for abdominal distention, abdominal pain and constipation. Negative for blood in stool, nausea and vomiting.   Musculoskeletal: Positive for  back pain. Negative for gait problem.   Skin: Negative for rash.   Neurological: Negative for weakness and headaches.   Hematological: Negative for adenopathy. Does not bruise/bleed easily.   Psychiatric/Behavioral: Negative for confusion and decreased concentration.     Current Outpatient Prescriptions on File Prior to Visit   Medication Sig Dispense Refill    ascorbic acid, vitamin C, (VITAMIN C) 500 MG tablet Take 500 mg by mouth once daily.      ferrous sulfate 325 mg (65 mg iron) Tab tablet Take 325 mg by mouth 2 (two) times daily.      furosemide (LASIX) 20 MG tablet Take 1 tablet (20 mg total) by mouth once daily. 30 tablet 11    ibuprofen (ADVIL,MOTRIN) 200 MG tablet Take 200 mg by mouth once daily.      multivitamin-zinc gluconate (SOURCECF PED VITAMIN WITH ZINC) 5 mg/mL Drop Take by mouth.      omeprazole (PRILOSEC) 20 MG capsule Take 20 mg by mouth once daily.      ondansetron (ZOFRAN-ODT) 4 MG TbDL Take 1 tablet (4 mg total) by mouth every 8 (eight) hours as needed. 60 tablet 5    polyethylene glycol (GLYCOLAX) 17 gram PwPk Take 17 g by mouth once daily.      spironolactone (ALDACTONE) 50 MG tablet Take 1 tablet (50 mg total) by mouth once daily. (Patient taking differently: Take 50 mg by mouth once daily. ON  HOLD AS OF 05/22/2017) 30 tablet 11    trazodone (DESYREL) 50 MG tablet Take 1 tablet (50 mg total) by mouth nightly as needed for Insomnia. 30 tablet 11     No current facility-administered medications on file prior to visit.          Objective:   Physical Exam   Constitutional: He is oriented to person, place, and time. He appears well-developed and well-nourished. No distress.   HENT:   Head: Normocephalic and atraumatic.   Mouth/Throat: Oropharynx is clear and moist. No oropharyngeal exudate.   Eyes: Conjunctivae are normal. Pupils are equal, round, and reactive to light. No scleral icterus.   Neck: Normal range of motion. Neck supple. No thyromegaly present.   Cardiovascular: Normal  rate, regular rhythm and normal heart sounds.  Exam reveals no gallop and no friction rub.    No murmur heard.  Pulmonary/Chest: Effort normal and breath sounds normal. No respiratory distress. He has no wheezes. He has no rales.   Abdominal: Soft. Bowel sounds are normal. He exhibits no distension. There is no tenderness. There is no rebound and no guarding.   Musculoskeletal: Normal range of motion. He exhibits edema (mild to moderate).   Lymphadenopathy:     He has no cervical adenopathy.   Neurological: He is alert and oriented to person, place, and time. No cranial nerve deficit.   Skin: Skin is warm and dry. No erythema.   Psychiatric: He has a normal mood and affect. His behavior is normal.   Vitals reviewed.    MELD-Na score: 13 at 10/5/2017  9:13 AM  MELD score: 13 at 10/5/2017  9:13 AM  Calculated from:  Serum Creatinine: 0.8 mg/dL (Rounded to 1) at 10/5/2017  9:13 AM  Serum Sodium: 143 mmol/L (Rounded to 137) at 10/5/2017  9:13 AM  Total Bilirubin: 2.9 mg/dL at 10/5/2017  9:13 AM  INR(ratio): 1.3 at 10/5/2017  9:13 AM  Age: 56 years     Lab Results   Component Value Date    GLU 84 11/15/2017    BUN 18 11/15/2017    CREATININE 0.8 11/15/2017    CALCIUM 8.7 11/15/2017     11/15/2017    K 4.8 11/15/2017     11/15/2017    PROT 6.7 11/15/2017    CO2 29 11/15/2017    WBC 4.30 10/05/2017    RBC 4.29 (L) 10/05/2017    HGB 13.1 (L) 10/05/2017    HCT 39.2 (L) 10/05/2017    PLT 98 (L) 10/05/2017     Lab Results   Component Value Date    CHOL 139 04/06/2017    TRIG 35 04/06/2017    HDL 52 04/06/2017    CHOLHDL 37.4 04/06/2017    TOTALCHOLEST 2.7 04/06/2017    ALBUMIN 2.2 (L) 11/15/2017    BILITOT 5.4 (H) 11/15/2017     (H) 11/15/2017     (H) 11/15/2017    ALKPHOS 286 (H) 11/15/2017    LABPROT 13.0 (H) 10/05/2017    INR 1.3 (H) 10/05/2017       Diagnostics: EMR reviewed      Assessment/Plan:   58yo male with decompensated hepatitis C and alcoholic cirrhosis complicated by HCC.  Patient is  experiencing worsening decompensation in the setting of recent Y-90 treatment for multifocal HCC.    Transplant candidacy:  Patient is not transplant candidate at this time due to extent of malignancy.  Initial tumor burden with 8.0cm lesion and concern for infiltrative disease.      HCC:  Patient has undergone Y-90 x 2 (5/2017, 10/2017).  Due for surveillance imaging in early January 2018.       Volume overload: Increasing low extremity edema and ascites.  Patient discontinued diuretics due to misunderstanding of recommendations.  He has restarted lasix 20mg twice daily.  Instructed to consolidate dosing to 40mg daily.  Patient with normal renal function at this time and appears to be tolerating current dosing.  He will follow daily weights and monitor fluid status, if not improved with current dosing will increase as tolerated.  Continue holding spironolactone for K > 4.      Melena:  CBC added to labs today.  Stable at 12.6.  Recent bleeding appears to be limited but patient instructed to f/u with Dr. Moyer for consideration of EGD to rule out UGI bleeding source that requires endoscopic management.  Discussed importance of urgent medication evaluation if clinically significant bleeding occurs.      Constipation:  Patient reports baseline constipation and has been taking dulcolax and miralax with incontinence following treatment.  Recommend to limit treatment to miralax as needed and only use dulcolax if there is no response to multiple doses of miralax.      Disability:  Patient is having more difficulty continuing full time employment with worsening of his liver disease.  There is evidence that his liver disease is progressing in the setting of radioembolization for therapy of extensive HCC.  If patient decides to pursue disability, instructed to provide forms to disability department and this will be appropriately completed.  It is reasonable to pursue disability based on the current status of his liver  disease and cancer.      RTC in approximately 6-8 weeks     Sigrid Nolan MD     [facility] - [phone] Luis Alberto (may call if you can't reach patient)      [employer] Patient Status  Functional Status: 60% - Requires occasional assistance but is able to care for needs  Physical Capacity: No Limitations    Outside Records Request: none

## 2017-11-27 NOTE — SUBJECTIVE & OBJECTIVE
Past Medical History:   Diagnosis Date    Anemia     Bone spur of foot     patient bone spurs removed    Cirrhosis     Fracture     Right tibia/fibula    Gallstones     GERD (gastroesophageal reflux disease)     Hernia     patient reports 2 herina in the groin area    Personal history of kidney stones        Past Surgical History:   Procedure Laterality Date    TONSILLECTOMY         Review of patient's allergies indicates:  No Known Allergies  Family History     Problem Relation (Age of Onset)    Cancer Mother    Hypertension Father    Stroke Father        Social History Main Topics    Smoking status: Current Every Day Smoker     Packs/day: 1.00     Years: 45.00     Types: Cigarettes    Smokeless tobacco: Never Used      Comment: Patient was enrolled in the smoking cessation program at  [facility].    Alcohol use No      Comment: stopped drinking 15 years ago    Drug use: No      Comment: stopped 15 years ago ( Cocaine and marijuana))    Sexual activity: Not on file     Review of Systems   Constitutional: Positive for fatigue. Negative for fever.   HENT: Negative for hearing loss.    Eyes: Negative for visual disturbance.   Respiratory: Positive for shortness of breath. Negative for cough.    Cardiovascular: Positive for leg swelling. Negative for chest pain and palpitations.   Gastrointestinal:        As per HPI.   Genitourinary: Positive for difficulty urinating. Negative for dysuria, frequency and hematuria.   Musculoskeletal: Negative for arthralgias and back pain.   Skin: Negative for color change and rash.   Neurological: Positive for weakness. Negative for dizziness, seizures, syncope, numbness and headaches.   Hematological: Does not bruise/bleed easily.   Psychiatric/Behavioral: The patient is not nervous/anxious.      Objective:     Vital Signs (Most Recent):  Temp: 97.9 °F (36.6 °C) (11/27/17 1015)  Pulse: 63 (11/27/17 1509)  Resp: 18 (11/27/17 1509)  BP: (!) 103/54 (11/27/17  1509)  SpO2: 97 % (11/27/17 1509) Vital Signs (24h Range):  Temp:  [97.9 °F (36.6 °C)] 97.9 °F (36.6 °C)  Pulse:  [56-70] 63  Resp:  [15-20] 18  SpO2:  [95 %-99 %] 97 %  BP: ()/(46-63) 103/54     Weight: 82.5 kg (181 lb 14.1 oz) (11/27/17 1112)  Body mass index is 26.1 kg/m².    No intake or output data in the 24 hours ending 11/27/17 1552    Lines/Drains/Airways     Peripheral Intravenous Line                 Peripheral IV - Single Lumen 11/27/17 1120 Right Antecubital less than 1 day                Physical Exam   Constitutional: He is oriented to person, place, and time. He appears well-developed and well-nourished.   HENT:   Head: Normocephalic and atraumatic.   Eyes: EOM are normal. Scleral icterus is present.   Neck: Normal range of motion. Neck supple.   Cardiovascular: Normal rate, regular rhythm and normal heart sounds.    No murmur heard.  Pulmonary/Chest: Effort normal and breath sounds normal. No respiratory distress. He has no wheezes.   Abdominal: Bowel sounds are normal. He exhibits distension (mild distension but soft). He exhibits no mass. There is no hepatomegaly. There is tenderness (epigastric).   Musculoskeletal: He exhibits edema (pitting LE edema).   Neurological: He is alert and oriented to person, place, and time. No cranial nerve deficit. Gait normal.   No asterixis.   Skin: Skin is warm and dry. No rash noted.   Psychiatric: He has a normal mood and affect.       Significant Labs:  CBC:   Recent Labs  Lab 11/27/17  1120   WBC 6.19   HGB 12.4*   HCT 35.1*        CMP:   Recent Labs  Lab 11/27/17  1120   GLU 85   CALCIUM 9.0   ALBUMIN 1.9*   PROT 6.6      K 4.6   CO2 30*   CL 95   BUN 46*   CREATININE 2.5*   ALKPHOS 356*   *   *   BILITOT 13.5*     Coagulation:   Recent Labs  Lab 11/27/17  1120   INR 1.5*   APTT 29.6       Significant Imaging:  Imaging results within the past 24 hours have been reviewed.

## 2017-11-27 NOTE — HPI
Martin Houston is a 57 year old male with a ESLD due to a history of alcoholic liver disease, chronic hepatitis C and HCC who presented to the Emergency Department with complaints of abdominal pain and constipation. The patient describes diffuse abdominal pain with associated decreased appetite, lower extremity edema and SOB. The patient also complains of feeling dehydrated. He denies fever, chills, confusion and decreased urine output. CT scan of the abdomen in the ED was significant for a liver mass with slight decrease in size and large volume abdominal ascites. Additionally, he was found to have an increased in his creatinine, total bilirubin and INR with resulting increased in MELD to 29.58. The patient is followed by [facility] and is currently not a transplant candidate. He reports being a full code and his father, Luis Alberto Samuel, is his surrogate medical decision maker.

## 2017-11-27 NOTE — ASSESSMENT & PLAN NOTE
-Continue Miralax and supportive care.   -Consider transition to Lactulose if encephalopathy suspected or reduction in volume of intake needed.

## 2017-11-27 NOTE — ASSESSMENT & PLAN NOTE
Acute liver decompensation with ascites.   R/o infection: blood culture, UA, CXR, dx/tx paracentesis.   Agree with giving albumin. We will also schedule it.   Check CMP and INR daily.   MELD at last hepatology visit was 13.     MELD-Na score: 30 at 11/27/2017 11:20 AM  MELD score: 30 at 11/27/2017 11:20 AM  Calculated from:  Serum Creatinine: 2.5 mg/dL at 11/27/2017 11:20 AM  Serum Sodium: 136 mmol/L at 11/27/2017 11:20 AM  Total Bilirubin: 13.5 mg/dL at 11/27/2017 11:20 AM  INR(ratio): 1.5 at 11/27/2017 11:20 AM  Age: 57 years

## 2017-11-27 NOTE — H&P
"[facility] Hospital Medicine  History & Physical    Patient Name: Martin Houston  MRN: [id number]  Admission Date: 11/27/2017  Attending Physician: No att. providers found   Primary Care Provider: Deana Morillo         Patient information was obtained from patient, past medical records and ER records.     Subjective:     Principal Problem:Alcoholic cirrhosis of liver with ascites    Chief Complaint:   Chief Complaint   Patient presents with    Abdominal Pain     Pt states. "I have liver cancer and had radiation treatment and I can't use the bathroom and haven't eaten in 4 days."        HPI: Martin Houston is a 57 year old male with a ESLD due to a history of alcoholic liver disease, chronic hepatitis C and HCC who presented to the Emergency Department with complaints of abdominal pain and constipation. The patient describes diffuse abdominal pain with associated decreased appetite, lower extremity edema and SOB. The patient also complains of feeling dehydrated. He denies fever, chills, confusion and decreased urine output. CT scan of the abdomen in the ED was significant for a liver mass with slight decrease in size and large volume abdominal ascites. Additionally, he was found to have an increased in his creatinine, total bilirubin and INR with resulting increased in MELD to 29.58. The patient is followed by [facility] and is currently not a transplant candidate. He reports being a full code and his father, Luis Alberto Samuel, is his surrogate medical decision maker.     Past Medical History:   Diagnosis Date    Anemia     Bone spur of foot     patient bone spurs removed    Cirrhosis     Fracture     Right tibia/fibula    Gallstones     GERD (gastroesophageal reflux disease)     Hernia     patient reports 2 herina in the groin area    Personal history of kidney stones        Past Surgical History:   Procedure Laterality Date    TONSILLECTOMY   "       Review of patient's allergies indicates:  No Known Allergies    No current facility-administered medications on file prior to encounter.      Current Outpatient Prescriptions on File Prior to Encounter   Medication Sig    ascorbic acid, vitamin C, (VITAMIN C) 500 MG tablet Take 500 mg by mouth once daily.    ferrous sulfate 325 mg (65 mg iron) Tab tablet Take 325 mg by mouth 3 (three) times daily.     furosemide (LASIX) 20 MG tablet Take 1 tablet (20 mg total) by mouth once daily.    multivitamin-zinc gluconate (SOURCECF PED VITAMIN WITH ZINC) 5 mg/mL Drop Take by mouth.    nadolol (CORGARD) 40 MG tablet Take 20 mg by mouth once daily.     omeprazole (PRILOSEC) 20 MG capsule Take 20 mg by mouth once daily.    polyethylene glycol (GLYCOLAX) 17 gram PwPk Take 17 g by mouth once daily.    spironolactone (ALDACTONE) 50 MG tablet Take 1 tablet (50 mg total) by mouth once daily. (Patient taking differently: Take 50 mg by mouth once daily. ON  HOLD AS OF 05/22/2017)    trazodone (DESYREL) 50 MG tablet Take 1 tablet (50 mg total) by mouth nightly as needed for Insomnia.    bisacodyl (DULCOLAX) 5 mg EC tablet Take 10 mg by mouth.    ibuprofen (ADVIL,MOTRIN) 200 MG tablet Take 200 mg by mouth once daily.    ondansetron (ZOFRAN-ODT) 4 MG TbDL Take 1 tablet (4 mg total) by mouth every 8 (eight) hours as needed.     Family History     Problem Relation (Age of Onset)    Cancer Mother    Hypertension Father    Stroke Father        Social History Main Topics    Smoking status: Current Every Day Smoker     Packs/day: 1.00     Years: 45.00     Types: Cigarettes    Smokeless tobacco: Never Used      Comment: Patient was enrolled in the smoking cessation program at  [facility].    Alcohol use No      Comment: stopped drinking 15 years ago    Drug use: No      Comment: stopped 15 years ago ( Cocaine and marijuana))    Sexual activity: Not on file     Review of Systems   Constitutional: Positive for  appetite change (decreased). Negative for chills, diaphoresis, fatigue and fever.   HENT: Negative for congestion, ear pain, mouth sores, sore throat and trouble swallowing.    Eyes: Negative for pain and visual disturbance.   Respiratory: Negative for cough, chest tightness and shortness of breath.    Cardiovascular: Positive for leg swelling (bilateral). Negative for chest pain and palpitations.   Gastrointestinal: Positive for abdominal pain and constipation. Negative for diarrhea and nausea.   Endocrine: Negative for cold intolerance, heat intolerance, polydipsia and polyuria.   Genitourinary: Negative for dysuria, frequency and hematuria.   Musculoskeletal: Negative for arthralgias, back pain, myalgias and neck pain.   Skin: Negative for pallor, rash and wound.   Allergic/Immunologic: Negative for environmental allergies and immunocompromised state.   Neurological: Negative for dizziness, seizures, syncope, weakness, numbness and headaches.   Hematological: Negative for adenopathy. Does not bruise/bleed easily.   Psychiatric/Behavioral: Negative for agitation, confusion and sleep disturbance.     Objective:     Vital Signs (Most Recent):  Temp: 97.9 °F (36.6 °C) (11/27/17 1015)  Pulse: 62 (11/27/17 1554)  Resp: 19 (11/27/17 1554)  BP: (!) 95/35 (11/27/17 1554)  SpO2: 98 % (11/27/17 1554) Vital Signs (24h Range):  Temp:  [97.9 °F (36.6 °C)] 97.9 °F (36.6 °C)  Pulse:  [56-70] 62  Resp:  [15-20] 19  SpO2:  [95 %-99 %] 98 %  BP: ()/(35-63) 95/35     Weight: 82.5 kg (181 lb 14.1 oz)  Body mass index is 26.1 kg/m².    Physical Exam   Constitutional: He is oriented to person, place, and time. He appears well-developed. He appears ill.   HENT:   Head: Normocephalic and atraumatic.   Eyes: Pupils are equal, round, and reactive to light. Scleral icterus is present.   Neck: Neck supple. No JVD present.   Cardiovascular: Normal rate, regular rhythm and normal heart sounds.    Pulmonary/Chest: Effort normal and breath  sounds normal. He has no wheezes.   Abdominal: Soft. Bowel sounds are normal. He exhibits fluid wave and ascites. There is generalized tenderness.   Musculoskeletal: Normal range of motion.        Right shoulder: He exhibits swelling (+2 bilateral lower extremity ).   Neurological: He is alert and oriented to person, place, and time.   Skin: Skin is warm and dry. No rash noted.   Psychiatric: He has a normal mood and affect. His behavior is normal. Thought content normal.   Nursing note and vitals reviewed.       Significant Labs:   CBC:   Recent Labs  Lab 11/27/17  1120   WBC 6.19   HGB 12.4*   HCT 35.1*        CMP:   Recent Labs  Lab 11/27/17  1120      K 4.6   CL 95   CO2 30*   GLU 85   BUN 46*   CREATININE 2.5*   CALCIUM 9.0   PROT 6.6   ALBUMIN 1.9*   BILITOT 13.5*   ALKPHOS 356*   *   *   ANIONGAP 11   EGFRNONAA 27*     Lactic Acid: No results for input(s): LACTATE in the last 48 hours.  All pertinent labs within the past 24 hours have been reviewed.    Significant Imaging: I have reviewed all pertinent imaging results/findings within the past 24 hours.   Imaging Results          X-Ray Chest PA And Lateral (Final result)  Result time 11/27/17 16:44:48    Final result by Vilma Sung MD (11/27/17 16:44:48)                 Impression:      Small left pleural effusion.      Electronically signed by: VILMA SUNG MD  Date:     11/27/17  Time:    16:44              Narrative:    Exam: XR CHEST PA AND LATERAL,    Date:  11/27/17 16:33:02    History: Pleural effusion.    Comparison:  No prior relevant studies available    Findings: Heart size is normal.    Lung fields appear clear.  There is blunting of the left costophrenic angle suggestive of a small left pleural effusion.                             CT Abdomen Pelvis  Without Contrast (Final result)  Result time 11/27/17 14:32:42    Final result by Vilma Sung MD (11/27/17 14:32:42)                 Impression:      Evidence of hepatic cirrhosis with liver mass, slightly decreased in size allowing for the difference in technique as compared to previous 08/28/2017.  Splenomegaly with esophageal and gastric varices.  Large volume abdominal ascites, new as compared to previous study.    Small left pleural effusion, also new.    Cholelithiasis with nonspecific gallbladder wall thickening.    Nonobstructing left nephrolithiasis.    Large right small bowel containing femoral hernia without obvious associated obstruction.    Fluid-filled left inguinal hernia.    No evidence of constipation/impaction.      All CT scans at this facility use automated dose modulation, iterative reconstruction and/or weight based dosing techniques when appropriate to reduce radiation dose to as low as reasonably achievable.       Electronically signed by: VILMA SUNG MD  Date:     11/27/17  Time:    14:32              Narrative:    CT ABDOMEN PELVIS WITHOUT CONTRAST,     Date:  11/27/17 13:47:01    Technique: Limited noncontrast CT scan of the abdomen and pelvis.    Comparison: 08/28/2017.    History:  constipation,     Findings:  Small left pleural effusion has developed with left lingular and left lower lobe discoid atelectasis.    The liver is abnormal with evidence of cirrhosis.  A focal right hepatic lobe lesion appears slightly smaller allowing for differences in technique as compared to the previous study.  Post therapy changes within the adjacent liver parenchyma are noted.    The spleen is enlarged, slightly decreased in size as compared to the previous study, currently measuring 15.3 cm whereas it previously measured 17.1 cm.  The gallbladder demonstrates wall thickening with several calcified gallstones.    Several gastric/esophageal varices are noted.    Right kidney is grossly normal.    A nonobstructing 5.4 mm calcification of the midpole of the left kidney is noted.  A similar calcification in the lower pole approximately 6.8 mm in  size is noted.    Since the prior study a large volume of abdominal ascites has developed.     Within the left groin a fluid filled single hernia is present.  The right groin a fat and small bowel containing hernia is present.  This appears larger than on the previous study.  No associated bowel obstruction.    The large bowel is grossly unremarkable without evidence of obvious impaction.    No additional findings.    Mild thoracolumbar scoliosis and spondylosis.                             X-Ray Abdomen Flat And Erect (Final result)  Result time 11/27/17 11:25:29    Final result by Corona Ascencio III, MD (11/27/17 11:25:29)                 Impression:     As above.  No free air.  No definite obstruction.      Electronically signed by: CORONA ASCENCIO MD  Date:     11/27/17  Time:    11:25              Narrative:    Abdomen series, 2 views.    Clinical indication: Abdominal pain.    Mild gaseous filling of numerous small bowel loops with minimal gastric and colonic gas.  The overall pattern is nonspecific and may be related to an adynamic ileus.  No free air.  Consider followup studies as indicated.                            Assessment/Plan:     * Alcoholic cirrhosis of liver with ascites    -MELD increase consistent with decompensation.   -Paracentesis today.   -Follow up fluid studies to rule out SBP.   -Continue Nadolol.   -Spironolactone and Lasix on hold due to ARF.           Acute kidney injury    -Hold spironolactone and lasix.   -Continue IV fluids and IV albumin.   -Monitor.           HCC (hepatocellular carcinoma)    -S/P two treatments of Y-90 treatment.  -GI following.           Bilateral lower extremity edema    -Due to third spacing from hypoalbuminemia associated with liver disease and malnutrition.   -Albumin may cause transient shift.   -Continue supportive care.           Constipation, chronic    -Continue Miralax and supportive care.   -Consider transition to Lactulose if encephalopathy  suspected or reduction in volume of intake needed.             VTE Risk Mitigation         Ordered     Place sequential compression device  Until discontinued      11/27/17 1331             ADILENE Lim  Department of Hospital Medicine   [facility]

## 2017-11-27 NOTE — INTERVAL H&P NOTE
The patient has been examined and the H&P has been reviewed:    I concur with the findings and no changes have occurred since H&P was written.        Active Hospital Problems    Diagnosis  POA    Ascites due to alcoholic cirrhosis [K70.31]  Yes      Resolved Hospital Problems    Diagnosis Date Resolved POA   No resolved problems to display.

## 2017-11-27 NOTE — OP NOTE
Sterile technique was performed in the RUQ, lidocaine was used as a local anesthetic.  2 liters of straw colored fluid removed and sent to lab for eval.  Pt tolerated the procedure well without immediate complications.  Please see radiologist report for details. F/u with PCP and/or ordering physician.

## 2017-11-27 NOTE — CONSULTS
[facility]  Consult Note    Patient Name: Martin Houston  MRN: [id number]  Admission Date: 11/27/2017  Hospital Length of Stay: 0 days  Code Status: No Order   Attending Provider: Alison Ramirez MD   Consulting Provider: Lisa Garvin PA-C  Primary Care Physician: Deana Morillo  Principal Problem:<principal problem not specified>    Inpatient consult to Gastroenterology  Consult performed by: LISA GARVIN  Consult ordered by: ALISON BURKS  Reason for consult: Worsening liver failure        Subjective:     HPI:  The patient presented to the ER for abdominal pain and increased swelling. The patient has a history of decompensated hepatitis C and alcoholic cirrhosis complicated by HCC. He is being followed by Hepatology in [place]. He has received two treatments of Y-90 treatment for multifocal HCC. He reports sharp lower abdominal pain, decreased appetite and nausea for four days. He denies vomiting or hematemesis. He also reports epigastric tenderness. He has chronic constipation and hasn't had a good BM in several days. He also reports swelling in the lower extremities. His labs show increased LFTs, INR and worsening renal function. He has been taking Lasix 40 mg daily for the last two weeks. CT scan showed a large amount of ascites, but no other acute issues. He has never had a paracentesis. He is hypotensive. Afebrile and normal WBC count. Hgb stable. UA pending.    Past Medical History:   Diagnosis Date    Anemia     Bone spur of foot     patient bone spurs removed    Cirrhosis     Fracture     Right tibia/fibula    Gallstones     GERD (gastroesophageal reflux disease)     Hernia     patient reports 2 herina in the groin area    Personal history of kidney stones        Past Surgical History:   Procedure Laterality Date    TONSILLECTOMY         Review of patient's allergies indicates:  No Known Allergies  Family History     Problem  Relation (Age of Onset)    Cancer Mother    Hypertension Father    Stroke Father        Social History Main Topics    Smoking status: Current Every Day Smoker     Packs/day: 1.00     Years: 45.00     Types: Cigarettes    Smokeless tobacco: Never Used      Comment: Patient was enrolled in the smoking cessation program at  [facility].    Alcohol use No      Comment: stopped drinking 15 years ago    Drug use: No      Comment: stopped 15 years ago ( Cocaine and marijuana))    Sexual activity: Not on file     Review of Systems   Constitutional: Positive for fatigue. Negative for fever.   HENT: Negative for hearing loss.    Eyes: Negative for visual disturbance.   Respiratory: Positive for shortness of breath. Negative for cough.    Cardiovascular: Positive for leg swelling. Negative for chest pain and palpitations.   Gastrointestinal:        As per HPI.   Genitourinary: Positive for difficulty urinating. Negative for dysuria, frequency and hematuria.   Musculoskeletal: Negative for arthralgias and back pain.   Skin: Negative for color change and rash.   Neurological: Positive for weakness. Negative for dizziness, seizures, syncope, numbness and headaches.   Hematological: Does not bruise/bleed easily.   Psychiatric/Behavioral: The patient is not nervous/anxious.      Objective:     Vital Signs (Most Recent):  Temp: 97.9 °F (36.6 °C) (11/27/17 1015)  Pulse: 63 (11/27/17 1509)  Resp: 18 (11/27/17 1509)  BP: (!) 103/54 (11/27/17 1509)  SpO2: 97 % (11/27/17 1509) Vital Signs (24h Range):  Temp:  [97.9 °F (36.6 °C)] 97.9 °F (36.6 °C)  Pulse:  [56-70] 63  Resp:  [15-20] 18  SpO2:  [95 %-99 %] 97 %  BP: ()/(46-63) 103/54     Weight: 82.5 kg (181 lb 14.1 oz) (11/27/17 1112)  Body mass index is 26.1 kg/m².    No intake or output data in the 24 hours ending 11/27/17 1552    Lines/Drains/Airways     Peripheral Intravenous Line                 Peripheral IV - Single Lumen 11/27/17 1120 Right Antecubital less than  1 day                Physical Exam   Constitutional: He is oriented to person, place, and time. He appears well-developed and well-nourished.   HENT:   Head: Normocephalic and atraumatic.   Eyes: EOM are normal. Scleral icterus is present.   Neck: Normal range of motion. Neck supple.   Cardiovascular: Normal rate, regular rhythm and normal heart sounds.    No murmur heard.  Pulmonary/Chest: Effort normal and breath sounds normal. No respiratory distress. He has no wheezes.   Abdominal: Bowel sounds are normal. He exhibits distension (mild distension but soft). He exhibits no mass. There is no hepatomegaly. There is tenderness (epigastric).   Musculoskeletal: He exhibits edema (pitting LE edema).   Neurological: He is alert and oriented to person, place, and time. No cranial nerve deficit. Gait normal.   No asterixis.   Skin: Skin is warm and dry. No rash noted.   Psychiatric: He has a normal mood and affect.       Significant Labs:  CBC:   Recent Labs  Lab 11/27/17  1120   WBC 6.19   HGB 12.4*   HCT 35.1*        CMP:   Recent Labs  Lab 11/27/17  1120   GLU 85   CALCIUM 9.0   ALBUMIN 1.9*   PROT 6.6      K 4.6   CO2 30*   CL 95   BUN 46*   CREATININE 2.5*   ALKPHOS 356*   *   *   BILITOT 13.5*     Coagulation:   Recent Labs  Lab 11/27/17  1120   INR 1.5*   APTT 29.6       Significant Imaging:  Imaging results within the past 24 hours have been reviewed.    Assessment/Plan:     Alcoholic cirrhosis of liver with ascites    Acute liver decompensation with ascites.   R/o infection: blood culture, UA, CXR, dx/tx paracentesis.   Agree with giving albumin. We will also schedule it.   Check CMP and INR daily.   MELD at last hepatology visit was 13.     MELD-Na score: 30 at 11/27/2017 11:20 AM  MELD score: 30 at 11/27/2017 11:20 AM  Calculated from:  Serum Creatinine: 2.5 mg/dL at 11/27/2017 11:20 AM  Serum Sodium: 136 mmol/L at 11/27/2017 11:20 AM  Total Bilirubin: 13.5 mg/dL at 11/27/2017 11:20  AM  INR(ratio): 1.5 at 11/27/2017 11:20 AM  Age: 57 years            HCC (hepatocellular carcinoma)    S/p two treatments of Y-90 treatment.  Acute decompensation could be secondary to his HCC. Other causes to be ruled out. See above.          Acute kidney injury    Await UA results.   Check urine sodium.   Start albumin.         Constipation, chronic    Continue home Miralax daily.   May consider adding Lactulose, but currently no signs of hepatic encephalopathy.             Thank you for your consult. I will follow-up with patient. Please contact us if you have any additional questions.    Ruben Garvin PA-C  Gastroenterology  [facility]

## 2017-11-27 NOTE — ASSESSMENT & PLAN NOTE
S/p two treatments of Y-90 treatment.  Acute decompensation could be secondary to his HCC. Other causes to be ruled out. See above.

## 2017-11-27 NOTE — ASSESSMENT & PLAN NOTE
-MELD increase consistent with decompensation.   -Paracentesis today.   -Follow up fluid studies to rule out SBP.   -Continue Nadolol.   -Spironolactone and Lasix on hold due to ARF.

## 2017-11-27 NOTE — ASSESSMENT & PLAN NOTE
Continue home Miralax daily.   May consider adding Lactulose, but currently no signs of hepatic encephalopathy.

## 2017-11-28 ENCOUNTER — TELEPHONE (OUTPATIENT)
Dept: HEPATOLOGY | Facility: CLINIC | Age: 57
End: 2017-11-28

## 2017-11-28 PROBLEM — N17.9 ACUTE RENAL FAILURE: Status: ACTIVE | Noted: 2017-11-28

## 2017-11-28 LAB
ALBUMIN SERPL BCP-MCNC: 2.3 G/DL
ALBUMIN SERPL BCP-MCNC: 2.3 G/DL
ALP SERPL-CCNC: 273 U/L
ALP SERPL-CCNC: 273 U/L
ALT SERPL W/O P-5'-P-CCNC: 118 U/L
ALT SERPL W/O P-5'-P-CCNC: 118 U/L
ANION GAP SERPL CALC-SCNC: 10 MMOL/L
ANION GAP SERPL CALC-SCNC: 10 MMOL/L
AST SERPL-CCNC: 241 U/L
AST SERPL-CCNC: 241 U/L
BASOPHILS # BLD AUTO: 0.02 K/UL
BASOPHILS # BLD AUTO: 0.02 K/UL
BASOPHILS NFR BLD: 0.5 %
BASOPHILS NFR BLD: 0.5 %
BILIRUB SERPL-MCNC: 12.5 MG/DL
BILIRUB SERPL-MCNC: 12.5 MG/DL
BILIRUB UR QL STRIP: ABNORMAL
BUN SERPL-MCNC: 47 MG/DL
BUN SERPL-MCNC: 47 MG/DL
CALCIUM SERPL-MCNC: 8.5 MG/DL
CALCIUM SERPL-MCNC: 8.5 MG/DL
CHLORIDE SERPL-SCNC: 98 MMOL/L
CHLORIDE SERPL-SCNC: 98 MMOL/L
CLARITY UR: CLEAR
CO2 SERPL-SCNC: 27 MMOL/L
CO2 SERPL-SCNC: 27 MMOL/L
COLOR UR: ABNORMAL
CREAT SERPL-MCNC: 1.9 MG/DL
CREAT SERPL-MCNC: 1.9 MG/DL
DIFFERENTIAL METHOD: ABNORMAL
DIFFERENTIAL METHOD: ABNORMAL
EOSINOPHIL # BLD AUTO: 0.1 K/UL
EOSINOPHIL # BLD AUTO: 0.1 K/UL
EOSINOPHIL NFR BLD: 1.8 %
EOSINOPHIL NFR BLD: 1.8 %
ERYTHROCYTE [DISTWIDTH] IN BLOOD BY AUTOMATED COUNT: 18.1 %
ERYTHROCYTE [DISTWIDTH] IN BLOOD BY AUTOMATED COUNT: 18.1 %
EST. GFR  (AFRICAN AMERICAN): 44 ML/MIN/1.73 M^2
EST. GFR  (AFRICAN AMERICAN): 44 ML/MIN/1.73 M^2
EST. GFR  (NON AFRICAN AMERICAN): 38 ML/MIN/1.73 M^2
EST. GFR  (NON AFRICAN AMERICAN): 38 ML/MIN/1.73 M^2
GLUCOSE SERPL-MCNC: 76 MG/DL
GLUCOSE SERPL-MCNC: 76 MG/DL
GLUCOSE UR QL STRIP: NEGATIVE
HCT VFR BLD AUTO: 30.6 %
HCT VFR BLD AUTO: 30.6 %
HGB BLD-MCNC: 10.7 G/DL
HGB BLD-MCNC: 10.7 G/DL
HGB UR QL STRIP: NEGATIVE
INR PPP: 1.6
KETONES UR QL STRIP: ABNORMAL
LEUKOCYTE ESTERASE UR QL STRIP: NEGATIVE
LYMPHOCYTES # BLD AUTO: 0.6 K/UL
LYMPHOCYTES # BLD AUTO: 0.6 K/UL
LYMPHOCYTES NFR BLD: 15.2 %
LYMPHOCYTES NFR BLD: 15.2 %
MCH RBC QN AUTO: 29.5 PG
MCH RBC QN AUTO: 29.5 PG
MCHC RBC AUTO-ENTMCNC: 35 G/DL
MCHC RBC AUTO-ENTMCNC: 35 G/DL
MCV RBC AUTO: 84 FL
MCV RBC AUTO: 84 FL
MONOCYTES # BLD AUTO: 0.6 K/UL
MONOCYTES # BLD AUTO: 0.6 K/UL
MONOCYTES NFR BLD: 14.7 %
MONOCYTES NFR BLD: 14.7 %
NEUTROPHILS # BLD AUTO: 2.6 K/UL
NEUTROPHILS # BLD AUTO: 2.6 K/UL
NEUTROPHILS NFR BLD: 67.8 %
NEUTROPHILS NFR BLD: 67.8 %
NITRITE UR QL STRIP: NEGATIVE
PATH INTERP FLD-IMP: NORMAL
PH UR STRIP: 6 [PH] (ref 5–8)
PLATELET # BLD AUTO: 158 K/UL
PLATELET # BLD AUTO: 158 K/UL
PMV BLD AUTO: 10 FL
PMV BLD AUTO: 10 FL
POCT GLUCOSE: 100 MG/DL (ref 70–110)
POTASSIUM SERPL-SCNC: 3.8 MMOL/L
POTASSIUM SERPL-SCNC: 3.8 MMOL/L
PROT SERPL-MCNC: 6.1 G/DL
PROT SERPL-MCNC: 6.1 G/DL
PROT UR QL STRIP: NEGATIVE
PROTHROMBIN TIME: 15.9 SEC
RBC # BLD AUTO: 3.63 M/UL
RBC # BLD AUTO: 3.63 M/UL
SODIUM SERPL-SCNC: 135 MMOL/L
SODIUM SERPL-SCNC: 135 MMOL/L
SP GR UR STRIP: 1.02 (ref 1–1.03)
URN SPEC COLLECT METH UR: ABNORMAL
UROBILINOGEN UR STRIP-ACNC: NEGATIVE EU/DL
WBC # BLD AUTO: 3.88 K/UL
WBC # BLD AUTO: 3.88 K/UL

## 2017-11-28 PROCEDURE — 99233 SBSQ HOSP IP/OBS HIGH 50: CPT | Mod: ,,, | Performed by: PHYSICIAN ASSISTANT

## 2017-11-28 PROCEDURE — 25000003 PHARM REV CODE 250: Performed by: INTERNAL MEDICINE

## 2017-11-28 PROCEDURE — 85025 COMPLETE CBC W/AUTO DIFF WBC: CPT

## 2017-11-28 PROCEDURE — P9047 ALBUMIN (HUMAN), 25%, 50ML: HCPCS | Performed by: PHYSICIAN ASSISTANT

## 2017-11-28 PROCEDURE — 25000003 PHARM REV CODE 250: Performed by: PHYSICIAN ASSISTANT

## 2017-11-28 PROCEDURE — 80053 COMPREHEN METABOLIC PANEL: CPT

## 2017-11-28 PROCEDURE — 21400001 HC TELEMETRY ROOM

## 2017-11-28 PROCEDURE — 36415 COLL VENOUS BLD VENIPUNCTURE: CPT

## 2017-11-28 PROCEDURE — 81003 URINALYSIS AUTO W/O SCOPE: CPT

## 2017-11-28 PROCEDURE — 25000003 PHARM REV CODE 250: Performed by: NURSE PRACTITIONER

## 2017-11-28 PROCEDURE — 63600175 PHARM REV CODE 636 W HCPCS: Performed by: PHYSICIAN ASSISTANT

## 2017-11-28 PROCEDURE — 85610 PROTHROMBIN TIME: CPT

## 2017-11-28 PROCEDURE — 82105 ALPHA-FETOPROTEIN SERUM: CPT

## 2017-11-28 RX ORDER — TRAMADOL HYDROCHLORIDE 50 MG/1
50 TABLET ORAL ONCE
Status: COMPLETED | OUTPATIENT
Start: 2017-11-28 | End: 2017-11-28

## 2017-11-28 RX ORDER — TRAMADOL HYDROCHLORIDE 50 MG/1
50 TABLET ORAL EVERY 6 HOURS PRN
Status: DISCONTINUED | OUTPATIENT
Start: 2017-11-28 | End: 2017-11-29 | Stop reason: HOSPADM

## 2017-11-28 RX ADMIN — POLYETHYLENE GLYCOL (3350) 17 G: 17 POWDER, FOR SOLUTION ORAL at 09:11

## 2017-11-28 RX ADMIN — ALBUMIN HUMAN 25 G: 0.25 SOLUTION INTRAVENOUS at 02:11

## 2017-11-28 RX ADMIN — ALBUMIN HUMAN 25 G: 0.25 SOLUTION INTRAVENOUS at 06:11

## 2017-11-28 RX ADMIN — TRAMADOL HYDROCHLORIDE 50 MG: 50 TABLET, FILM COATED ORAL at 02:11

## 2017-11-28 RX ADMIN — ALBUMIN HUMAN 25 G: 0.25 SOLUTION INTRAVENOUS at 09:11

## 2017-11-28 RX ADMIN — PANTOPRAZOLE SODIUM 40 MG: 40 TABLET, DELAYED RELEASE ORAL at 09:11

## 2017-11-28 RX ADMIN — CEFTRIAXONE 1 G: 1 INJECTION, SOLUTION INTRAVENOUS at 01:11

## 2017-11-28 RX ADMIN — FERROUS SULFATE TAB EC 325 MG (65 MG FE EQUIVALENT) 325 MG: 325 (65 FE) TABLET DELAYED RESPONSE at 09:11

## 2017-11-28 RX ADMIN — BISACODYL 5 MG: 5 TABLET, COATED ORAL at 09:11

## 2017-11-28 RX ADMIN — NADOLOL 20 MG: 20 TABLET ORAL at 09:11

## 2017-11-28 RX ADMIN — TRAMADOL HYDROCHLORIDE 50 MG: 50 TABLET, FILM COATED ORAL at 12:11

## 2017-11-28 NOTE — PLAN OF CARE
Problem: Patient Care Overview  Goal: Plan of Care Review  Outcome: Ongoing (interventions implemented as appropriate)  Shift assessment completed. Patient updated on POC for the day, complains of lower back pain. Received a one time order for 50mg Ultram. IV saline locked. Neuro: WDL. CVR: Continuous telemetry monitoring maintained, patient NSR. HR= 56-65. Sats=WDL on room air. Patient voids per urinal. Ms:generalized weakness. Skin:yellow with bilateral lower edema. Reviewed fall precautions and hourly rounding with patient. Bed low & locked. Will continue to monitor.

## 2017-11-28 NOTE — SUBJECTIVE & OBJECTIVE
Interval History: The patient reports some improvement in complaints of abdominal pain and constipation. Urine microscopy yesterday showed possible infection, but repeat urinalysis today was negative. Per GI, patient will complete IV albumin overnight with plans for discharge tomorrow if stable.     Review of Systems   Constitutional: Positive for appetite change (decreased). Negative for chills, diaphoresis, fatigue and fever.   HENT: Negative for congestion, ear pain, mouth sores, sore throat and trouble swallowing.    Eyes: Negative for pain and visual disturbance.   Respiratory: Negative for cough, chest tightness and shortness of breath.    Cardiovascular: Positive for leg swelling (bilateral). Negative for chest pain and palpitations.   Gastrointestinal: Positive for abdominal pain and constipation. Negative for diarrhea and nausea.   Endocrine: Negative for cold intolerance, heat intolerance, polydipsia and polyuria.   Genitourinary: Negative for dysuria, frequency and hematuria.   Musculoskeletal: Negative for arthralgias, back pain, myalgias and neck pain.   Skin: Negative for pallor, rash and wound.   Allergic/Immunologic: Negative for environmental allergies and immunocompromised state.   Neurological: Negative for dizziness, seizures, syncope, weakness, numbness and headaches.   Hematological: Negative for adenopathy. Does not bruise/bleed easily.   Psychiatric/Behavioral: Negative for agitation, confusion and sleep disturbance.     Objective:     Vital Signs (Most Recent):  Temp: 97.6 °F (36.4 °C) (11/28/17 1536)  Pulse: (!) 56 (11/28/17 1536)  Resp: 18 (11/28/17 1536)  BP: (!) 100/58 (11/28/17 1536)  SpO2: (!) 91 % (11/28/17 1536) Vital Signs (24h Range):  Temp:  [97.4 °F (36.3 °C)-98.1 °F (36.7 °C)] 97.6 °F (36.4 °C)  Pulse:  [52-66] 56  Resp:  [] 18  SpO2:  [91 %-95 %] 91 %  BP: ()/(43-64) 100/58     Weight: 80.3 kg (177 lb 0.5 oz)  Body mass index is 25.4 kg/m².    Intake/Output Summary  (Last 24 hours) at 11/28/17 1554  Last data filed at 11/28/17 0830   Gross per 24 hour   Intake              120 ml   Output              250 ml   Net             -130 ml      Physical Exam   Constitutional: He is oriented to person, place, and time. He appears well-developed. He appears ill.   HENT:   Head: Normocephalic and atraumatic.   Eyes: Pupils are equal, round, and reactive to light. Scleral icterus is present.   Neck: Neck supple. No JVD present.   Cardiovascular: Normal rate, regular rhythm and normal heart sounds.    Pulmonary/Chest: Effort normal and breath sounds normal. He has no wheezes.   Abdominal: Soft. Bowel sounds are normal. He exhibits fluid wave and ascites. There is generalized tenderness (improved).   Musculoskeletal: Normal range of motion. He exhibits edema (+1 bilateral lower extremity  ).   Neurological: He is alert and oriented to person, place, and time.   Skin: Skin is warm and dry. No rash noted.   Psychiatric: He has a normal mood and affect. His behavior is normal. Thought content normal.   Nursing note and vitals reviewed.      Significant Labs:   CBC:     Recent Labs  Lab 11/27/17 1120 11/28/17  0518   WBC 6.19 3.88*  3.88*   HGB 12.4* 10.7*  10.7*   HCT 35.1* 30.6*  30.6*    158  158     CMP:     Recent Labs  Lab 11/27/17 1120 11/28/17  0518    135*  135*   K 4.6 3.8  3.8   CL 95 98  98   CO2 30* 27  27   GLU 85 76  76   BUN 46* 47*  47*   CREATININE 2.5* 1.9*  1.9*   CALCIUM 9.0 8.5*  8.5*   PROT 6.6 6.1  6.1   ALBUMIN 1.9* 2.3*  2.3*   BILITOT 13.5* 12.5*  12.5*   ALKPHOS 356* 273*  273*   * 241*  241*   * 118*  118*   ANIONGAP 11 10  10   EGFRNONAA 27* 38*  38*     Coagulation:     Recent Labs  Lab 11/27/17  1120 11/28/17  0518   INR 1.5* 1.6*   APTT 29.6  --        Significant Imaging: I have reviewed all pertinent imaging results/findings within the past 24 hours.

## 2017-11-28 NOTE — SUBJECTIVE & OBJECTIVE
Subjective:     Interval History:   The patient is resting comfortably. The present family member thinks he looks better today. The patient reported eating well this morning. He felt better after his paracentesis. Denies abd pain. He had 2 L removed. Cell count not consistent with SBP. Culture pending. Renal function improved. UA showed bacteria. Urine sodium <20.     Review of Systems   Constitutional:        See Interval History for daily ROS.      Objective:     Vital Signs (Most Recent):  Temp: 97.6 °F (36.4 °C) (11/28/17 1137)  Pulse: (!) 56 (11/28/17 1137)  Resp: 18 (11/28/17 1137)  BP: 105/60 (11/28/17 1137)  SpO2: (!) 91 % (11/28/17 1137) Vital Signs (24h Range):  Temp:  [97.4 °F (36.3 °C)-98.1 °F (36.7 °C)] 97.6 °F (36.4 °C)  Pulse:  [52-66] 56  Resp:  [] 18  SpO2:  [91 %-98 %] 91 %  BP: ()/(35-64) 105/60     Weight: 80.3 kg (177 lb 0.5 oz) (11/28/17 0345)  Body mass index is 25.4 kg/m².      Intake/Output Summary (Last 24 hours) at 11/28/17 1358  Last data filed at 11/28/17 0830   Gross per 24 hour   Intake              120 ml   Output              250 ml   Net             -130 ml       Lines/Drains/Airways     Peripheral Intravenous Line                 Peripheral IV - Single Lumen 11/27/17 1120 Right Antecubital 1 day                Physical Exam   Constitutional: He is oriented to person, place, and time. He appears well-developed and well-nourished. No distress.   HENT:   Head: Normocephalic and atraumatic.   Eyes: EOM are normal.   Cardiovascular: Normal rate and regular rhythm.    Pulmonary/Chest: Effort normal and breath sounds normal. No respiratory distress. He has no wheezes.   Abdominal: Soft. Bowel sounds are normal. He exhibits no distension. There is no tenderness.   Neurological: He is alert and oriented to person, place, and time. No cranial nerve deficit.   Skin: He is not diaphoretic.   Psychiatric: His behavior is normal.       Significant Labs:  CBC:   Recent Labs  Lab  11/27/17  1120 11/28/17 0518   WBC 6.19 3.88*  3.88*   HGB 12.4* 10.7*  10.7*   HCT 35.1* 30.6*  30.6*    158  158     CMP:   Recent Labs  Lab 11/28/17 0518   GLU 76  76   CALCIUM 8.5*  8.5*   ALBUMIN 2.3*  2.3*   PROT 6.1  6.1   *  135*   K 3.8  3.8   CO2 27  27   CL 98  98   BUN 47*  47*   CREATININE 1.9*  1.9*   ALKPHOS 273*  273*   *  118*   *  241*   BILITOT 12.5*  12.5*     Coagulation:   Recent Labs  Lab 11/27/17  1120 11/28/17 0518   INR 1.5* 1.6*   APTT 29.6  --          Significant Imaging:  Imaging results within the past 24 hours have been reviewed.

## 2017-11-28 NOTE — PROGRESS NOTES
[facility] Hospital Medicine  Progress Note    Patient Name: Martin Houston  MRN: [id number]  Patient Class: IP- Inpatient   Admission Date: 11/27/2017  Length of Stay: 1 days  Attending Physician: Suresh Mahan MD  Primary Care Provider: Deana Morillo        Subjective:     Principal Problem:Alcoholic cirrhosis of liver with ascites    HPI:  Martin Houston is a 57 year old male with a ESLD due to a history of alcoholic liver disease, chronic hepatitis C and HCC who presented to the Emergency Department with complaints of abdominal pain and constipation. The patient describes diffuse abdominal pain with associated decreased appetite, lower extremity edema and SOB. The patient also complains of feeling dehydrated. He denies fever, chills, confusion and decreased urine output. CT scan of the abdomen in the ED was significant for a liver mass with slight decrease in size and large volume abdominal ascites. Additionally, he was found to have an increased in his creatinine, total bilirubin and INR with resulting increased in MELD to 29.58. The patient is followed by [facility] and is currently not a transplant candidate. He reports being a full code and his father, Luis Alberto Samuel, is his surrogate medical decision maker.     Hospital Course:  The patient reports some improvement in complaints of abdominal pain and constipation. Urine microscopy yesterday showed possible infection, but repeat urinalysis today was negative. Per GI, patient will complete IV albumin overnight with plans for discharge tomorrow if stable.     Interval History: The patient reports some improvement in complaints of abdominal pain and constipation. Urine microscopy yesterday showed possible infection, but repeat urinalysis today was negative. Per GI, patient will complete IV albumin overnight with plans for discharge tomorrow if stable.     Review of Systems   Constitutional:  Positive for appetite change (decreased). Negative for chills, diaphoresis, fatigue and fever.   HENT: Negative for congestion, ear pain, mouth sores, sore throat and trouble swallowing.    Eyes: Negative for pain and visual disturbance.   Respiratory: Negative for cough, chest tightness and shortness of breath.    Cardiovascular: Positive for leg swelling (bilateral). Negative for chest pain and palpitations.   Gastrointestinal: Positive for abdominal pain and constipation. Negative for diarrhea and nausea.   Endocrine: Negative for cold intolerance, heat intolerance, polydipsia and polyuria.   Genitourinary: Negative for dysuria, frequency and hematuria.   Musculoskeletal: Negative for arthralgias, back pain, myalgias and neck pain.   Skin: Negative for pallor, rash and wound.   Allergic/Immunologic: Negative for environmental allergies and immunocompromised state.   Neurological: Negative for dizziness, seizures, syncope, weakness, numbness and headaches.   Hematological: Negative for adenopathy. Does not bruise/bleed easily.   Psychiatric/Behavioral: Negative for agitation, confusion and sleep disturbance.     Objective:     Vital Signs (Most Recent):  Temp: 97.6 °F (36.4 °C) (11/28/17 1536)  Pulse: (!) 56 (11/28/17 1536)  Resp: 18 (11/28/17 1536)  BP: (!) 100/58 (11/28/17 1536)  SpO2: (!) 91 % (11/28/17 1536) Vital Signs (24h Range):  Temp:  [97.4 °F (36.3 °C)-98.1 °F (36.7 °C)] 97.6 °F (36.4 °C)  Pulse:  [52-66] 56  Resp:  [] 18  SpO2:  [91 %-95 %] 91 %  BP: ()/(43-64) 100/58     Weight: 80.3 kg (177 lb 0.5 oz)  Body mass index is 25.4 kg/m².    Intake/Output Summary (Last 24 hours) at 11/28/17 1554  Last data filed at 11/28/17 0830   Gross per 24 hour   Intake              120 ml   Output              250 ml   Net             -130 ml      Physical Exam   Constitutional: He is oriented to person, place, and time. He appears well-developed. He appears ill.   HENT:   Head: Normocephalic and  atraumatic.   Eyes: Pupils are equal, round, and reactive to light. Scleral icterus is present.   Neck: Neck supple. No JVD present.   Cardiovascular: Normal rate, regular rhythm and normal heart sounds.    Pulmonary/Chest: Effort normal and breath sounds normal. He has no wheezes.   Abdominal: Soft. Bowel sounds are normal. He exhibits fluid wave and ascites. There is generalized tenderness (improved).   Musculoskeletal: Normal range of motion. He exhibits edema (+1 bilateral lower extremity  ).   Neurological: He is alert and oriented to person, place, and time.   Skin: Skin is warm and dry. No rash noted.   Psychiatric: He has a normal mood and affect. His behavior is normal. Thought content normal.   Nursing note and vitals reviewed.      Significant Labs:   CBC:     Recent Labs  Lab 11/27/17  1120 11/28/17  0518   WBC 6.19 3.88*  3.88*   HGB 12.4* 10.7*  10.7*   HCT 35.1* 30.6*  30.6*    158  158     CMP:     Recent Labs  Lab 11/27/17  1120 11/28/17  0518    135*  135*   K 4.6 3.8  3.8   CL 95 98  98   CO2 30* 27  27   GLU 85 76  76   BUN 46* 47*  47*   CREATININE 2.5* 1.9*  1.9*   CALCIUM 9.0 8.5*  8.5*   PROT 6.6 6.1  6.1   ALBUMIN 1.9* 2.3*  2.3*   BILITOT 13.5* 12.5*  12.5*   ALKPHOS 356* 273*  273*   * 241*  241*   * 118*  118*   ANIONGAP 11 10  10   EGFRNONAA 27* 38*  38*     Coagulation:     Recent Labs  Lab 11/27/17  1120 11/28/17  0518   INR 1.5* 1.6*   APTT 29.6  --        Significant Imaging: I have reviewed all pertinent imaging results/findings within the past 24 hours.    Assessment/Plan:      * Alcoholic cirrhosis of liver with ascites    -MELD remains elevated at 27 consistent with decompensation.   -S/P Paracentesis 11/27/17. Fluid studies negative for SBP.   -Continue Nadolol.   -Spironolactone and Lasix on hold due to ARF.           Acute kidney injury    -Hold spironolactone and lasix.   -Some improvement with IV fluids and IV albumin.    -Monitor.           HCC (hepatocellular carcinoma)    -S/P two treatments of Y-90 treatment.  -GI following.  -Outpatient follow up with Dr. Nolan.            Bilateral lower extremity edema    -Due to third spacing from hypoalbuminemia associated with liver disease and malnutrition.   -Improved with Albumin which may be transient.   -Continue supportive care.           Constipation, chronic    -Continue Miralax and supportive care.   -Consider transition to Lactulose if encephalopathy suspected or reduction in volume of intake needed.             VTE Risk Mitigation         Ordered     Medium Risk of VTE  Once      11/27/17 2043     Place sequential compression device  Until discontinued      11/27/17 1650              ADILENE Lim  Department of Hospital Medicine   [facility]

## 2017-11-28 NOTE — TELEPHONE ENCOUNTER
----- Message from Rosa M Anglin sent at 11/27/2017  8:15 AM CST -----  Contact: pt  Has severe stomach pains, swollen , can't eat, constipated said he LM on Friday no one called said he is having bad pain wants a call back right away please @ # [phone].

## 2017-11-28 NOTE — ASSESSMENT & PLAN NOTE
Acute liver decompensation with ascites - stable overnight.   UA showed bacteria. Will start Rocephin. No SBP.    Continue albumin as scheduled.    Check CMP and INR daily.   MELD at last hepatology visit was 13.   If patient remains stable overnight, ok with discharge in the morning. This was discussed with Kanwal Wolff with [facility].     MELD-Na score: 28 at 11/28/2017  5:18 AM  MELD score: 27 at 11/28/2017  5:18 AM  Calculated from:  Serum Creatinine: 1.9 mg/dL at 11/28/2017  5:18 AM  Serum Sodium: 135 mmol/L at 11/28/2017  5:18 AM  Total Bilirubin: 12.5 mg/dL at 11/28/2017  5:18 AM  INR(ratio): 1.6 at 11/28/2017  5:18 AM  Age: 57 years

## 2017-11-28 NOTE — ASSESSMENT & PLAN NOTE
-Due to third spacing from hypoalbuminemia associated with liver disease and malnutrition.   -Improved with Albumin which may be transient.   -Continue supportive care.

## 2017-11-28 NOTE — HOSPITAL COURSE
The patient underwent paracentesis on 11/27/17 with removal of 2 liters of serous fluid. Cell count did not show evidence of SBP. There was some concern regarding bacteria in his initial urine, but repeat showed no evidence of infection. Urine microscopy on admission showed possible infection, but repeat urinalysis on 11/28/17 was negative. He reports come improvement in abdominal pain and constipation. Per GI, the patient completed 6 doses of IV albumin. His creatinine improved to 1.6. However, his MELD remains at 25. At the time of discharge, his trazodone was discontinued due to somnolence and his constipation management was changed to Lactulose due to complaints of gas and slow response with Miralax and Dulcolax. He was instructed to follow up with Dr. Nolan for discussion regarding continue HCC treatment and transplant. Additionally, the patient was instructed to follow up with his Gastroenterologist, Dr. Moyer as he missed an appointment on the date of discharge. The patient was further instructed to follow up with his PCP for clearance to return to work because his job is physical and frequently involves climbing.

## 2017-11-28 NOTE — ASSESSMENT & PLAN NOTE
S/p two treatments of Y-90 treatment.  Acute decompensation could be secondary to his HCC.   Plan as above.

## 2017-11-28 NOTE — PLAN OF CARE
CM met with patient at the bedside to assess for discharge needs.  Patient lives at home alone and is independent with all needs.  Patient does not anticipate any discharge needs at this time.  CM provided a transitional care folder, information on advanced directives, information on pharmacy bedside delivery, and discharge planning begins on admission with contact information for DOUG Caballero    D/C Plan:  Home with no needs    CM handed off to DOUG Caballero     11/28/17 1230   Discharge Assessment   Assessment Type Discharge Planning Assessment   Confirmed/corrected address and phone number on facesheet? Yes   Assessment information obtained from? Patient;Caregiver;Medical Record   Expected Length of Stay (days) (TBD)   Communicated expected length of stay with patient/caregiver yes   Prior to hospitilization cognitive status: Alert/Oriented   Prior to hospitalization functional status: Independent   Current cognitive status: Alert/Oriented   Current Functional Status: Independent   Facility Arrived From: home   Lives With alone   Able to Return to Prior Arrangements yes   Is patient able to care for self after discharge? Yes   Who are your caregiver(s) and their phone number(s)? Liliya Samuel, mother [phone]   Patient's perception of discharge disposition home or selfcare   Readmission Within The Last 30 Days no previous admission in last 30 days   Patient currently being followed by outpatient case management? No   Patient currently receives any other outside agency services? No   Equipment Currently Used at Home none   Do you have any problems affording any of your prescribed medications? No   Is the patient taking medications as prescribed? yes   Does the patient have transportation home? Yes   Transportation Available family or friend will provide   Dialysis Name and Scheduled days NA   Does the patient receive services at the [facility]madin Clinic? No   Discharge Plan A Home   Discharge Plan B Home   Patient/Family  In Agreement With Plan yes

## 2017-11-28 NOTE — PROGRESS NOTES
[facility]enterology  Progress Note    Patient Name: Martin Houston  MRN: [id number]  Admission Date: 11/27/2017  Hospital Length of Stay: 1 days  Code Status: Full Code   Attending Provider: Suresh Mahan MD  Consulting Provider: Ruben Garvin PA-C  Primary Care Physician: Deana Morillo  Principal Problem: Alcoholic cirrhosis of liver with ascites      Subjective:     Interval History:   The patient is resting comfortably. The present family member thinks he looks better today. The patient reported eating well this morning. He felt better after his paracentesis. Denies abd pain. He had 2 L removed. Cell count not consistent with SBP. Culture pending. Renal function improved. UA showed bacteria. Urine sodium <20.     Review of Systems   Constitutional:        See Interval History for daily ROS.      Objective:     Vital Signs (Most Recent):  Temp: 97.6 °F (36.4 °C) (11/28/17 1137)  Pulse: (!) 56 (11/28/17 1137)  Resp: 18 (11/28/17 1137)  BP: 105/60 (11/28/17 1137)  SpO2: (!) 91 % (11/28/17 1137) Vital Signs (24h Range):  Temp:  [97.4 °F (36.3 °C)-98.1 °F (36.7 °C)] 97.6 °F (36.4 °C)  Pulse:  [52-66] 56  Resp:  [] 18  SpO2:  [91 %-98 %] 91 %  BP: ()/(35-64) 105/60     Weight: 80.3 kg (177 lb 0.5 oz) (11/28/17 0345)  Body mass index is 25.4 kg/m².      Intake/Output Summary (Last 24 hours) at 11/28/17 1358  Last data filed at 11/28/17 0830   Gross per 24 hour   Intake              120 ml   Output              250 ml   Net             -130 ml       Lines/Drains/Airways     Peripheral Intravenous Line                 Peripheral IV - Single Lumen 11/27/17 1120 Right Antecubital 1 day                Physical Exam   Constitutional: He is oriented to person, place, and time. He appears well-developed and well-nourished. No distress.   HENT:   Head: Normocephalic and atraumatic.   Eyes: EOM are normal.   Cardiovascular: Normal rate and regular rhythm.     Pulmonary/Chest: Effort normal and breath sounds normal. No respiratory distress. He has no wheezes.   Abdominal: Soft. Bowel sounds are normal. He exhibits no distension. There is no tenderness.   Neurological: He is alert and oriented to person, place, and time. No cranial nerve deficit.   Skin: He is not diaphoretic.   Psychiatric: His behavior is normal.       Significant Labs:  CBC:   Recent Labs  Lab 11/27/17  1120 11/28/17  0518   WBC 6.19 3.88*  3.88*   HGB 12.4* 10.7*  10.7*   HCT 35.1* 30.6*  30.6*    158  158     CMP:   Recent Labs  Lab 11/28/17 0518   GLU 76  76   CALCIUM 8.5*  8.5*   ALBUMIN 2.3*  2.3*   PROT 6.1  6.1   *  135*   K 3.8  3.8   CO2 27  27   CL 98  98   BUN 47*  47*   CREATININE 1.9*  1.9*   ALKPHOS 273*  273*   *  118*   *  241*   BILITOT 12.5*  12.5*     Coagulation:   Recent Labs  Lab 11/27/17  1120 11/28/17 0518   INR 1.5* 1.6*   APTT 29.6  --          Significant Imaging:  Imaging results within the past 24 hours have been reviewed.    Assessment/Plan:     * Alcoholic cirrhosis of liver with ascites    Acute liver decompensation with ascites - stable overnight.   UA showed bacteria. Will start Rocephin. No SBP.    Continue albumin as scheduled.    Check CMP and INR daily.   MELD at last hepatology visit was 13.   If patient remains stable overnight, ok with discharge in the morning. This was discussed with Kanwal Wolff with .     MELD-Na score: 28 at 11/28/2017  5:18 AM  MELD score: 27 at 11/28/2017  5:18 AM  Calculated from:  Serum Creatinine: 1.9 mg/dL at 11/28/2017  5:18 AM  Serum Sodium: 135 mmol/L at 11/28/2017  5:18 AM  Total Bilirubin: 12.5 mg/dL at 11/28/2017  5:18 AM  INR(ratio): 1.6 at 11/28/2017  5:18 AM  Age: 57 years            HCC (hepatocellular carcinoma)    S/p two treatments of Y-90 treatment.  Acute decompensation could be secondary to his HCC.   Plan as above.           Acute kidney injury    Improved.   Start  Rocephin. Ok to change to oral Cipro at d/c.            Thank you for your consult. I will follow-up with patient. Please contact us if you have any additional questions.    Ruben Garvin PA-C  Gastroenterology  [facility]

## 2017-11-28 NOTE — PROGRESS NOTES
Received report from DOUG Lindsey at this time. No c/o pain. No c/o SOB, no signs of acute distress noted. Agree with previous assessment.  IV CDI. Pt denies needs at this time. Will continue to monitor.

## 2017-11-28 NOTE — ASSESSMENT & PLAN NOTE
-MELD remains elevated at 27 consistent with decompensation.   -S/P Paracentesis 11/27/17. Fluid studies negative for SBP.   -Continue Nadolol.   -Spironolactone and Lasix on hold due to ARF.

## 2017-11-29 VITALS
WEIGHT: 183 LBS | RESPIRATION RATE: 18 BRPM | BODY MASS INDEX: 26.2 KG/M2 | HEART RATE: 60 BPM | TEMPERATURE: 98 F | HEIGHT: 70 IN | DIASTOLIC BLOOD PRESSURE: 60 MMHG | OXYGEN SATURATION: 92 % | SYSTOLIC BLOOD PRESSURE: 113 MMHG

## 2017-11-29 LAB
AFP SERPL-MCNC: 3279 NG/ML
ALBUMIN SERPL BCP-MCNC: 3 G/DL
ALP SERPL-CCNC: 253 U/L
ALT SERPL W/O P-5'-P-CCNC: 116 U/L
ANION GAP SERPL CALC-SCNC: 10 MMOL/L
AST SERPL-CCNC: 240 U/L
BASOPHILS # BLD AUTO: 0.02 K/UL
BASOPHILS NFR BLD: 0.4 %
BILIRUB SERPL-MCNC: 13.8 MG/DL
BUN SERPL-MCNC: 45 MG/DL
CALCIUM SERPL-MCNC: 9 MG/DL
CHLORIDE SERPL-SCNC: 97 MMOL/L
CO2 SERPL-SCNC: 28 MMOL/L
CREAT SERPL-MCNC: 1.6 MG/DL
DIFFERENTIAL METHOD: ABNORMAL
EOSINOPHIL # BLD AUTO: 0.1 K/UL
EOSINOPHIL NFR BLD: 2 %
ERYTHROCYTE [DISTWIDTH] IN BLOOD BY AUTOMATED COUNT: 18.3 %
EST. GFR  (AFRICAN AMERICAN): 54 ML/MIN/1.73 M^2
EST. GFR  (NON AFRICAN AMERICAN): 46 ML/MIN/1.73 M^2
GLUCOSE SERPL-MCNC: 85 MG/DL
HCT VFR BLD AUTO: 29.2 %
HGB BLD-MCNC: 10.1 G/DL
INR PPP: 1.5
LYMPHOCYTES # BLD AUTO: 0.5 K/UL
LYMPHOCYTES NFR BLD: 11.2 %
MCH RBC QN AUTO: 29.1 PG
MCHC RBC AUTO-ENTMCNC: 34.6 G/DL
MCV RBC AUTO: 84 FL
MONOCYTES # BLD AUTO: 0.9 K/UL
MONOCYTES NFR BLD: 18.9 %
NEUTROPHILS # BLD AUTO: 3.1 K/UL
NEUTROPHILS NFR BLD: 67.5 %
PLATELET # BLD AUTO: 174 K/UL
PMV BLD AUTO: 10.1 FL
POTASSIUM SERPL-SCNC: 3.3 MMOL/L
PROT SERPL-MCNC: 6.5 G/DL
PROTHROMBIN TIME: 15.4 SEC
RBC # BLD AUTO: 3.47 M/UL
SODIUM SERPL-SCNC: 135 MMOL/L
WBC # BLD AUTO: 4.55 K/UL

## 2017-11-29 PROCEDURE — 63600175 PHARM REV CODE 636 W HCPCS: Performed by: PHYSICIAN ASSISTANT

## 2017-11-29 PROCEDURE — 85610 PROTHROMBIN TIME: CPT

## 2017-11-29 PROCEDURE — 25000003 PHARM REV CODE 250: Performed by: PHYSICIAN ASSISTANT

## 2017-11-29 PROCEDURE — 25000003 PHARM REV CODE 250: Performed by: INTERNAL MEDICINE

## 2017-11-29 PROCEDURE — 36415 COLL VENOUS BLD VENIPUNCTURE: CPT

## 2017-11-29 PROCEDURE — 85025 COMPLETE CBC W/AUTO DIFF WBC: CPT

## 2017-11-29 PROCEDURE — 80053 COMPREHEN METABOLIC PANEL: CPT

## 2017-11-29 PROCEDURE — P9047 ALBUMIN (HUMAN), 25%, 50ML: HCPCS | Performed by: PHYSICIAN ASSISTANT

## 2017-11-29 RX ORDER — NADOLOL 20 MG/1
20 TABLET ORAL DAILY
Qty: 30 TABLET | Refills: 0 | Status: ON HOLD | OUTPATIENT
Start: 2017-11-29 | End: 2017-12-10 | Stop reason: HOSPADM

## 2017-11-29 RX ORDER — TRAMADOL HYDROCHLORIDE 50 MG/1
50 TABLET ORAL EVERY 12 HOURS PRN
Qty: 15 TABLET | Refills: 0 | Status: SHIPPED | OUTPATIENT
Start: 2017-11-29 | End: 2017-12-04 | Stop reason: SDUPTHER

## 2017-11-29 RX ORDER — LACTULOSE 10 G/15ML
20 SOLUTION ORAL 3 TIMES DAILY
Qty: 2700 ML | Refills: 0 | Status: SHIPPED | OUTPATIENT
Start: 2017-11-29 | End: 2017-12-29

## 2017-11-29 RX ADMIN — PANTOPRAZOLE SODIUM 40 MG: 40 TABLET, DELAYED RELEASE ORAL at 08:11

## 2017-11-29 RX ADMIN — FERROUS SULFATE TAB EC 325 MG (65 MG FE EQUIVALENT) 325 MG: 325 (65 FE) TABLET DELAYED RESPONSE at 08:11

## 2017-11-29 RX ADMIN — BISACODYL 5 MG: 5 TABLET, COATED ORAL at 08:11

## 2017-11-29 RX ADMIN — ALBUMIN HUMAN 25 G: 0.25 SOLUTION INTRAVENOUS at 05:11

## 2017-11-29 RX ADMIN — NADOLOL 20 MG: 20 TABLET ORAL at 08:11

## 2017-11-29 NOTE — PLAN OF CARE
11/29/17 1303   Final Note   Assessment Type Final Discharge Note   Discharge Disposition Home

## 2017-11-29 NOTE — PROGRESS NOTES
Went over discharge instructions with patient.   Stressed importance of making and keeping all follow ups.   Patient verbalized understanding and has no questions in regards to discharge.  IV removed, catheter intact.  Telemetry box removed.  Patient getting dressed and will let staff know when he is ready to be wheeled to waiting car.

## 2017-11-29 NOTE — PROGRESS NOTES
[facility] -   Adult Nutrition  Consult Note    SUMMARY     Recommendations    Recommendation/Intervention: 1) Recommend continuation of current diet plan 2) Recommend adding Boost (strawberry flavored) TID 3) Will encourage PO intake  Goals: Patient will tolerate diet and will increase PO intake to % by next RD visit  Nutrition Goal Status: new  Communication of RD Recs: other (comment) (care plan and sticky note)      Reason for Assessment    Reason for Assessment: identified at risk by screening criteria   Diagnosis  1. Ascites due to alcoholic cirrhosis    2. History of hepatocellular carcinoma    3. Chronic liver failure without hepatic coma    4. Acute renal failure, unspecified acute renal failure type    5. Occult blood positive stool    6. Shortness of breath    7. Alcoholic cirrhosis of liver with ascites    8. HCC (hepatocellular carcinoma)      Past Medical History:   Diagnosis Date    Anemia     Bone spur of foot     patient bone spurs removed    Cirrhosis     Fracture     Right tibia/fibula    Gallstones     GERD (gastroesophageal reflux disease)     Hernia     patient reports 2 herina in the groin area    Personal history of kidney stones      General Information Comments: Spoke with patient at bedside. Patient seemed disoriented and seemed to have difficulty speaking. Patient stated that he is continuing to have swallowing issues. Patient stated that he has not been eating, only drinking since yesterday. Patients family member entered the room during assessment and stated that patient has had some weight loss but was not sure how much. Patient was not able to determine how much either. Spoke with patient about sending Boost supplements and patient stated that he likes strawberry flavor.    Nutrition Discharge Planning: Low Sodium Diet and Boost TID    Nutrition Prescription Ordered    Current Diet Order: Low Sodium, 2gm    Evaluation of Received Nutrients/Fluid Intake  %  "Intake of Estimated Energy Needs: 0 - 25 %  % Meal Intake: 0%     Nutrition Risk Screen     Nutrition Risk Screen: dysphagia or difficulty swallowing    Nutrition/Diet History  Food Preferences: no cultural or religous food preferences identified    Labs/Tests/Procedures/Meds  Diagnostic Test/Procedure Review: reviewed  Pertinent Labs Reviewed: reviewed, pertinent   BMP  Lab Results   Component Value Date     (L) 11/29/2017    K 3.3 (L) 11/29/2017    CL 97 11/29/2017    CO2 28 11/29/2017    BUN 45 (H) 11/29/2017    CREATININE 1.6 (H) 11/29/2017    CALCIUM 9.0 11/29/2017    ANIONGAP 10 11/29/2017    ESTGFRAFRICA 54 (A) 11/29/2017    EGFRNONAA 46 (A) 11/29/2017     Lab Results   Component Value Date    ALBUMIN 3.0 (L) 11/29/2017     Lab Results   Component Value Date    CALCIUM 9.0 11/29/2017     Pertinent Medications Reviewed: reviewed, pertinent       Physical Findings  Overall Physical Appearance: nourished  Oral/Mouth Cavity: WDL  Skin: pallor (Star score 22)    Anthropometrics  Temp: 97.8 °F (36.6 °C)  Height: 5' 10" (177.8 cm)  Weight Method: Bed Scale  Weight: 83 kg (182 lb 15.7 oz)  Ideal Body Weight (IBW), Male: 166 lb  % Ideal Body Weight, Male (lb): 110.23 lb  BMI (Calculated): 26.3  BMI Grade: 25 - 29.9 - overweight    Estimated/Assessed Needs  Weight Used For Calorie Calculations: 83 kg (182 lb 15.7 oz)   Energy Need Method: [facility]  RMR ([place]-St. Jeor Equation): 1651.25  Weight Used For Protein Calculations: 83 kg (182 lb 15.7 oz)  1.2 gm Protein (gm): 99.81  Fluid Requirements (mL): 1 ml/reyes or per MD  Fluid Need Method: RDA Method      Assessment and Plan  Nutrition Problem  Inadequate intake    Related to (etiology):   Difficulty swallowing    Signs and Symptoms (as evidenced by):   Patient has not eaten in two days    Interventions/Recommendations (treatment strategy):  See above    Nutrition Diagnosis Status:   New      Monitor and Evaluation    Food and Nutrient Intake: energy " intake, food and beverage intake  Anthropometric Measurements: weight, weight change, body mass index  Biochemical Data, Medical Tests and Procedures: electrolyte and renal panel, glucose/endocrine profile, lipid profile  Nutrition-Focused Physical Findings: overall appearance    Nutrition Risk         Nutrition Follow-Up  Yes (x1 weekly)    Charlotte Fitzgerald, Dietetic Intern  I certify that I directed the dietetic intern in service delivery and guided them using my skilled judgment. As the cosigning dietitian, I have reviewed the dietetic interns documentation and am responsible for the treatment, assessment, and plan.

## 2017-11-29 NOTE — ASSESSMENT & PLAN NOTE
-MELD remains elevated at 25 consistent with decompensation.   -S/P Paracentesis 11/27/17. Fluid studies negative for SBP.   -Continue Nadolol.   -Spironolactone and Lasix on hold due to ARF and dehydration.

## 2017-11-29 NOTE — PLAN OF CARE
Problem: Patient Care Overview  Goal: Plan of Care Review  Outcome: Ongoing (interventions implemented as appropriate)  POC discussed w/patient, verbalized understanding. SB on monitor. VSS. Voids per BRP. Pt c/o pain relieved with norco. Frequent weight change encouraged. Patient turns independently in bed. Fall precautions in place, bed alarm on. Patient denies needs at this time.

## 2017-11-29 NOTE — DISCHARGE SUMMARY
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 The patient describes diffuse abdominal pain with associated decreased appetite, lower extremity edema and SOB. The patient also complains of feeling dehydrated. He denies fever, chills, confusion and decreased urine output. CT scan of the abdomen in the ED was significant for a liver mass with slight decrease in size and large volume abdominal ascites. Additionally, he was found to have an increased in his creatinine, total bilirubin and INR with resulting increased in MELD to 29.58. The patient is followed by [facility] and is currently not a transplant candidate. He reports being a full code and his father, Luis Alberto Samuel, is his surrogate medical decision maker.     * No surgery found *      Hospital Course:   The patient underwent paracentesis on 11/27/17 with removal of 2 liters of serous fluid. Cell count did not show evidence of SBP. There was some concern regarding bacteria in his initial urine, but repeat showed no evidence of infection. Urine microscopy on admission showed possible infection, but repeat urinalysis on 11/28/17 was negative. He reports come improvement in abdominal pain and constipation. Per GI, the patient completed 6 doses of IV albumin. His creatinine improved to 1.6. However, his MELD remains at 25. At the time of discharge, his trazodone was discontinued due to  somnolence and his constipation management was changed to Lactulose due to complaints of gas and slow response with Miralax and Dulcolax. He was instructed to follow up with Dr. Nolan for discussion regarding continue HCC treatment and transplant. Additionally, the patient was instructed to follow up with his Gastroenterologist, Dr. Moyer as he missed an appointment on the date of discharge. The patient was further instructed to follow up with his PCP for clearance to return to work because his job is physical and frequently involves climbing.      Consults:   Consults         Status Ordering Provider     Inpatient consult to Gastroenterology  Once     Provider:  (Not yet assigned)    Completed CORINNA BURKS          * Alcoholic cirrhosis of liver with ascites    -MELD remains elevated at 25 consistent with decompensation.   -S/P Paracentesis 11/27/17. Fluid studies negative for SBP.   -Continue Nadolol.   -Spironolactone and Lasix on hold due to ARF and dehydration.           Acute kidney injury    Hold spironolactone and lasix.             HCC (hepatocellular carcinoma)    -S/P two treatments of Y-90 treatment.  -Outpatient follow up with Dr. Nolan.            Bilateral lower extremity edema    -Continue supportive care.   -Diuretics on hold due to dehydration and ARF.        Constipation, chronic    Titrate lactulose to consistent bowel movements daily.             Final Active Diagnoses:    Diagnosis Date Noted POA    PRINCIPAL PROBLEM:  Alcoholic cirrhosis of liver with ascites [K70.31] 11/27/2017 Yes    Acute kidney injury [N17.9] 11/27/2017 Yes    HCC (hepatocellular carcinoma) [C22.0] 04/26/2017 Yes    Bilateral lower extremity edema [R60.0] 11/27/2017 Yes    Constipation, chronic [K59.09] 11/27/2017 Yes    Acute renal failure [N17.9] 11/28/2017 Yes      Problems Resolved During this Admission:    Diagnosis Date Noted Date Resolved POA       Discharged Condition: stable    Disposition: Home or  Self Care    Follow Up:  Follow-up Information     Deana Morillo In 3 days.    Specialty:  Radiology           Sigrid Nolan MD In 1 week.    Specialties:  Transplant, Hepatology  Contact information:  [address]  [facility] [id number] [phone]             Benji Moyer MD In 1 week.    Specialty:  Gastroenterology  Contact information:  [address] [facility]  [address]  [facility] [address] [phone]                 Patient Instructions:     Diet Low Sodium, 2gm         Significant Diagnostic Studies: Labs:   CMP   Recent Labs  Lab 11/27/17  1120 11/28/17  0518 11/29/17  0419    135*  135* 135*   K 4.6 3.8  3.8 3.3*   CL 95 98  98 97   CO2 30* 27  27 28   GLU 85 76  76 85   BUN 46* 47*  47* 45*   CREATININE 2.5* 1.9*  1.9* 1.6*   CALCIUM 9.0 8.5*  8.5* 9.0   PROT 6.6 6.1  6.1 6.5   ALBUMIN 1.9* 2.3*  2.3* 3.0*   BILITOT 13.5* 12.5*  12.5* 13.8*   ALKPHOS 356* 273*  273* 253*   * 241*  241* 240*   * 118*  118* 116*   ANIONGAP 11 10  10 10   ESTGFRAFRICA 31* 44*  44* 54*   EGFRNONAA 27* 38*  38* 46*   , CBC   Recent Labs  Lab 11/27/17  1120 11/28/17 0518 11/29/17  0419   WBC 6.19 3.88*  3.88* 4.55   HGB 12.4* 10.7*  10.7* 10.1*   HCT 35.1* 30.6*  30.6* 29.2*    158  158 174    and INR   Lab Results   Component Value Date    INR 1.5 (H) 11/29/2017    INR 1.6 (H) 11/28/2017    INR 1.5 (H) 11/27/2017       Pending Diagnostic Studies:     None         Medications:  Reconciled Home Medications:   Current Discharge Medication List      START taking these medications    Details   lactulose (CHRONULAC) 20 gram/30 mL Soln Take 30 mLs (20 g total) by mouth 3 (three) times daily. May reduce dose for diarrhea.  Qty: 2700 mL, Refills: 0      traMADol (ULTRAM) 50 mg tablet Take 1 tablet (50 mg total) by mouth every 12 (twelve) hours as needed for Pain.  Qty: 15 tablet, Refills: 0      UNABLE TO FIND Take 30 mLs by mouth 4 (four) times daily as  needed (Dry mouth). medication name: Biotine Mouth Wash         CONTINUE these medications which have CHANGED    Details   nadolol (CORGARD) 20 MG tablet Take 1 tablet (20 mg total) by mouth once daily.  Qty: 30 tablet, Refills: 0         CONTINUE these medications which have NOT CHANGED    Details   ascorbic acid, vitamin C, (VITAMIN C) 500 MG tablet Take 500 mg by mouth once daily.      multivitamin-zinc gluconate (SOURCECF PED VITAMIN WITH ZINC) 5 mg/mL Drop Take by mouth.      omeprazole (PRILOSEC) 20 MG capsule Take 20 mg by mouth once daily.      ondansetron (ZOFRAN-ODT) 4 MG TbDL Take 1 tablet (4 mg total) by mouth every 8 (eight) hours as needed.  Qty: 60 tablet, Refills: 5         STOP taking these medications       ferrous sulfate 325 mg (65 mg iron) Tab tablet Comments:   Reason for Stopping:         furosemide (LASIX) 20 MG tablet Comments:   Reason for Stopping:         polyethylene glycol (GLYCOLAX) 17 gram PwPk Comments:   Reason for Stopping:         spironolactone (ALDACTONE) 50 MG tablet Comments:   Reason for Stopping:         trazodone (DESYREL) 50 MG tablet Comments:   Reason for Stopping:         bisacodyl (DULCOLAX) 5 mg EC tablet Comments:   Reason for Stopping:         ibuprofen (ADVIL,MOTRIN) 200 MG tablet Comments:   Reason for Stopping:               Indwelling Lines/Drains at time of discharge:   Lines/Drains/Airways          No matching active lines, drains, or airways          Time spent on the discharge of patient: Greater than 30 minutes.  Patient was seen and examined on the date of discharge and determined to be suitable for discharge.         ADILENE Lim  Department of Hospital Medicine  [facility] -

## 2017-11-30 ENCOUNTER — TELEPHONE (OUTPATIENT)
Dept: HEPATOLOGY | Facility: CLINIC | Age: 57
End: 2017-11-30

## 2017-11-30 NOTE — TELEPHONE ENCOUNTER
----- Message from Sigrid Nolan MD sent at 11/30/2017 12:18 PM CST -----  Patient needs hospital follow-up    ----- Message -----  From: Ruben Garvin PA-C  Sent: 11/30/2017  11:25 AM  To: Ynes Michaud MD, MD Dr. Ovidio Ceballos,   Mr. Houston was discharged yesterday after admission for acute decompensation. Dr. Michaud recommends a clinic follow up in one week. She wasn't sure if ya'll could get him in or if she should try to see him in [place].   Thanks,   Ruben Garvin

## 2017-11-30 NOTE — TELEPHONE ENCOUNTER
MA called patient to schedule his hospital discharge follow up visit. Patient accepted 12/4/17 the same day he is coming here for his MRI. YUSUF

## 2017-12-01 ENCOUNTER — NURSE TRIAGE (OUTPATIENT)
Dept: ADMINISTRATIVE | Facility: CLINIC | Age: 57
End: 2017-12-01

## 2017-12-01 LAB
BACTERIA FLD AEROBE CULT: NO GROWTH
GRAM STN SPEC: NORMAL
GRAM STN SPEC: NORMAL

## 2017-12-02 LAB
BACTERIA BLD CULT: NORMAL
BACTERIA BLD CULT: NORMAL

## 2017-12-02 NOTE — TELEPHONE ENCOUNTER
"Admit 11/27  Pt called re stool softener- lactulose. rec toTitrate to have three to five BMs/d. Pt also throwing up when drinking milk.   Per DC summary: constipation management was changed to Lactulose due to complaints of gas and slow response with Miralax and Dulcolax...instructed to follow up with Dr. Nolan for discussion regarding continue HCC treatment and transplant.. patient was instructed to follow up with his Gastroenterologist. Pt notified.     Reason for Disposition   SEVERE diarrhea (e.g., 7 or more times / day more than normal) (all triage questions negative)    Answer Assessment - Initial Assessment Questions  1. DIARRHEA SEVERITY: "How bad is the diarrhea?" "How many extra stools have you had in the past 24 hours than normal?"     - MILD: Few loose or mushy BMs; increase of 1-3 stools over normal daily number of stools; mild increase in ostomy output.    - MODERATE: Increase of 4-6 stools daily over normal; moderate increase in ostomy output.    - SEVERE (or Worst Possible): Increase of 7 or more stools daily over normal; moderate increase in ostomy output; incontinence.     D x10+, afeb, no blood  2. ONSET: "When did the diarrhea begin?"      This am 12/1   3. BM CONSISTENCY: "How loose or watery is the diarrhea?"      Watery /partially dig food   4. VOMITING: "Are you also vomiting?" If so, ask: "How many times in the past 24 hours?"      Vomiting with milk  Vx 1-2/d, last uop x2 today at 6pm   5. ABDOMINAL PAIN: "Are you having any abdominal pain?" If yes: "What does it feel like?" (e.g., crampy, dull, intermittent, constant)      Pain in lower back for a few weeks, hx double hernia  6. ABDOMINAL PAIN SEVERITY: If present, ask: "How bad is the pain?"  (e.g., Scale 1-10; mild, moderate, or severe)     - MILD (1-3): doesn't interfere with normal activities, abdomen soft and not tender to touch      - MODERATE (4-7): interferes with normal activities or awakens from sleep, tender to touch      - " "SEVERE (8-10): excruciating pain, doubled over, unable to do any normal activities      5.5-6  7. ORAL INTAKE: If vomiting, "Have you been able to drink liquids?" "How much fluids have you had in the past 24 hours?"     Water   8. HYDRATION: "Any signs of dehydration?" (e.g., dry mouth [not just dry lips], too weak to stand, dizziness, new weight loss) "When did you last urinate?"     Dry mouth all the time, able to walk around   9. EXPOSURE: "Have you traveled to a foreign country recently?" "Have you been exposed to anyone with diarrhea?" "Could you have eaten any food that was spoiled?"    n/a  10. OTHER SYMPTOMS: "Do you have any other symptoms?" (e.g., fever, blood in stool)      Afeb.    Protocols used: [facility]-  rec to titrate lactulose to have 3-5 BMs/d. ED for s/sx dehydration. Call back with questions.     "

## 2017-12-04 ENCOUNTER — HOSPITAL ENCOUNTER (OUTPATIENT)
Dept: RADIOLOGY | Facility: HOSPITAL | Age: 57
Discharge: HOME | End: 2017-12-04
Attending: FAMILY MEDICINE
Payer: MEDICAID

## 2017-12-04 ENCOUNTER — OFFICE VISIT (OUTPATIENT)
Dept: INTERVENTIONAL RADIOLOGY/VASCULAR | Facility: CLINIC | Age: 57
End: 2017-12-04
Payer: MEDICAID

## 2017-12-04 ENCOUNTER — TELEPHONE (OUTPATIENT)
Dept: HEMATOLOGY/ONCOLOGY | Facility: CLINIC | Age: 57
End: 2017-12-04

## 2017-12-04 ENCOUNTER — OFFICE VISIT (OUTPATIENT)
Dept: HEPATOLOGY | Facility: CLINIC | Age: 57
End: 2017-12-04
Payer: MEDICAID

## 2017-12-04 VITALS
HEART RATE: 55 BPM | SYSTOLIC BLOOD PRESSURE: 111 MMHG | OXYGEN SATURATION: 55 % | WEIGHT: 180.13 LBS | BODY MASS INDEX: 25.79 KG/M2 | DIASTOLIC BLOOD PRESSURE: 61 MMHG | RESPIRATION RATE: 18 BRPM | HEIGHT: 70 IN

## 2017-12-04 VITALS
BODY MASS INDEX: 25.81 KG/M2 | SYSTOLIC BLOOD PRESSURE: 98 MMHG | HEIGHT: 70 IN | WEIGHT: 180.31 LBS | DIASTOLIC BLOOD PRESSURE: 61 MMHG | HEART RATE: 51 BPM

## 2017-12-04 DIAGNOSIS — C22.0 HCC (HEPATOCELLULAR CARCINOMA): Primary | ICD-10-CM

## 2017-12-04 DIAGNOSIS — K70.31 ALCOHOLIC CIRRHOSIS OF LIVER WITH ASCITES: ICD-10-CM

## 2017-12-04 DIAGNOSIS — C22.0 HEPATOCELLULAR CARCINOMA: ICD-10-CM

## 2017-12-04 PROCEDURE — 99214 OFFICE O/P EST MOD 30 MIN: CPT | Mod: PBBFAC,25,27 | Performed by: INTERNAL MEDICINE

## 2017-12-04 PROCEDURE — 99215 OFFICE O/P EST HI 40 MIN: CPT | Mod: S$PBB,,, | Performed by: INTERNAL MEDICINE

## 2017-12-04 PROCEDURE — 99212 OFFICE O/P EST SF 10 MIN: CPT | Mod: PBBFAC,25

## 2017-12-04 PROCEDURE — 99999 PR PBB SHADOW E&M-EST. PATIENT-LVL IV: CPT | Mod: PBBFAC,,, | Performed by: INTERNAL MEDICINE

## 2017-12-04 PROCEDURE — 99213 OFFICE O/P EST LOW 20 MIN: CPT | Mod: S$PBB,,, | Performed by: RADIOLOGY

## 2017-12-04 PROCEDURE — 74183 MRI ABD W/O CNTR FLWD CNTR: CPT | Mod: 26,,, | Performed by: RADIOLOGY

## 2017-12-04 PROCEDURE — A9585 GADOBUTROL INJECTION: HCPCS | Performed by: FAMILY MEDICINE

## 2017-12-04 PROCEDURE — 99999 PR PBB SHADOW E&M-EST. PATIENT-LVL II: CPT | Mod: PBBFAC,,,

## 2017-12-04 PROCEDURE — 25500020 PHARM REV CODE 255: Performed by: FAMILY MEDICINE

## 2017-12-04 PROCEDURE — 74183 MRI ABD W/O CNTR FLWD CNTR: CPT | Mod: TC

## 2017-12-04 RX ORDER — TRAMADOL HYDROCHLORIDE 50 MG/1
50 TABLET ORAL EVERY 12 HOURS PRN
Qty: 60 TABLET | Refills: 0 | Status: ON HOLD | OUTPATIENT
Start: 2017-12-04 | End: 2017-12-10 | Stop reason: HOSPADM

## 2017-12-04 RX ORDER — GADOBUTROL 604.72 MG/ML
10 INJECTION INTRAVENOUS
Status: COMPLETED | OUTPATIENT
Start: 2017-12-04 | End: 2017-12-04

## 2017-12-04 RX ADMIN — GADOBUTROL 10 ML: 604.72 INJECTION INTRAVENOUS at 08:12

## 2017-12-04 NOTE — PROGRESS NOTES
Hepatology Clinic Follow-up Visit     Original Referring Provider: Benji Moyer  Current Corresponding Physician: Benji MENESES Native Liver Diagnosis: Primary Liver Malignancy: Hepatoma (HCC) and Cirrhosis    Reason for Visit: HCC management      Subjective:     Martin Houston is a 57 y.o. 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  The patient is accompanied by his parents and aunt.    Patient last seen in clinic on 11/15/2017.  Since that time the patient has been hospitalized for SHANNAN.  Diuretic therapy is currently held and the patient reports stable fluid status.    Overall he feels unwell.  Reports worsening abdominal and back pain.  He was discharged with tramadol and reports that this helps his pain.  He also reports anorexia and nausea/vomiting.  He is eating mostly a liquid diet with nutritional supplements due to poor appetite.      MRI today with review in IR clinic prior to hepatology visit.  Patient has had progression of HCC with tumor thrombus despite treatment with Y-90.  Not likely felt to be a candidate for further locoregional therapy at this time.  Awaiting further review by Dr. Bird.      The patient is having ~ 10 bowel movements daily with lactulose twice daily.  Also with escalation of bowel regimen during hospitalization for reported constipation.      PMH:   Alcoholic and hepatitis C cirrhosis  HCC    PSH:  No abdominal surgeries;  Right knee surgery     FH: no family history of liver disease    SH:  No current alcohol consumption, continuing to smoke tobacco, no illicit drug use     Review of Systems   Constitutional: Positive for activity change and fatigue. Negative for appetite change, chills and unexpected weight change.   HENT: Negative for hearing loss and sore throat.    Eyes: Negative for visual disturbance.   Respiratory: Negative for shortness of breath.    Cardiovascular: Negative for chest pain and leg  swelling.   Gastrointestinal: Positive for abdominal pain. Negative for abdominal distention, blood in stool, nausea and vomiting.   Musculoskeletal: Positive for back pain. Negative for gait problem.   Skin: Negative for rash.   Neurological: Negative for weakness and headaches.   Hematological: Negative for adenopathy. Does not bruise/bleed easily.   Psychiatric/Behavioral: Positive for confusion. Negative for decreased concentration.     Current Outpatient Prescriptions on File Prior to Visit   Medication Sig Dispense Refill    ascorbic acid, vitamin C, (VITAMIN C) 500 MG tablet Take 500 mg by mouth once daily.      lactulose (CHRONULAC) 20 gram/30 mL Soln Take 30 mLs (20 g total) by mouth 3 (three) times daily. May reduce dose for diarrhea. 2700 mL 0    multivitamin-zinc gluconate (SOURCECF PED VITAMIN WITH ZINC) 5 mg/mL Drop Take by mouth.      nadolol (CORGARD) 20 MG tablet Take 1 tablet (20 mg total) by mouth once daily. 30 tablet 0    omeprazole (PRILOSEC) 20 MG capsule Take 20 mg by mouth once daily.      ondansetron (ZOFRAN-ODT) 4 MG TbDL Take 1 tablet (4 mg total) by mouth every 8 (eight) hours as needed. 60 tablet 5    traMADol (ULTRAM) 50 mg tablet Take 1 tablet (50 mg total) by mouth every 12 (twelve) hours as needed for Pain. 15 tablet 0    UNABLE TO FIND Take 30 mLs by mouth 4 (four) times daily as needed (Dry mouth). medication name: Biotine Mouth Wash       Current Facility-Administered Medications on File Prior to Visit   Medication Dose Route Frequency Provider Last Rate Last Dose    [COMPLETED] gadobutrol 10 mL  10 mL Intravenous ONCE PRN Rosa M Roe, NP   10 mL at 12/04/17 0845         Objective:   Physical Exam   Constitutional: He is oriented to person, place, and time. He appears well-developed and well-nourished. No distress.   HENT:   Head: Normocephalic and atraumatic.   Mouth/Throat: Oropharynx is clear and moist. No oropharyngeal exudate.   Eyes: Conjunctivae are  normal. Pupils are equal, round, and reactive to light. No scleral icterus.   Neck: Normal range of motion. Neck supple. No thyromegaly present.   Cardiovascular: Normal rate, regular rhythm and normal heart sounds.  Exam reveals no gallop and no friction rub.    No murmur heard.  Pulmonary/Chest: Effort normal and breath sounds normal. No respiratory distress. He has no wheezes. He has no rales.   Abdominal: Soft. Bowel sounds are normal. He exhibits no distension. There is no tenderness. There is no rebound and no guarding.   Musculoskeletal: Normal range of motion. He exhibits no edema.   Lymphadenopathy:     He has no cervical adenopathy.   Neurological: He is alert and oriented to person, place, and time. No cranial nerve deficit.   Skin: Skin is warm and dry. No erythema.   Psychiatric: He has a normal mood and affect. His behavior is normal.   Vitals reviewed.    MELD-Na score: 26 at 11/29/2017  4:19 AM  MELD score: 25 at 11/29/2017  4:19 AM  Calculated from:  Serum Creatinine: 1.6 mg/dL at 11/29/2017  4:19 AM  Serum Sodium: 135 mmol/L at 11/29/2017  4:19 AM  Total Bilirubin: 13.8 mg/dL at 11/29/2017  4:19 AM  INR(ratio): 1.5 at 11/29/2017  4:19 AM  Age: 57 years     Lab Results   Component Value Date    GLU 85 11/29/2017    BUN 45 (H) 11/29/2017    CREATININE 1.6 (H) 11/29/2017    CALCIUM 9.0 11/29/2017     (L) 11/29/2017    K 3.3 (L) 11/29/2017    CL 97 11/29/2017    PROT 6.5 11/29/2017    CO2 28 11/29/2017    WBC 4.55 11/29/2017    RBC 3.47 (L) 11/29/2017    HGB 10.1 (L) 11/29/2017    HCT 29.2 (L) 11/29/2017     11/29/2017     Lab Results   Component Value Date    CHOL 139 04/06/2017    TRIG 35 04/06/2017    HDL 52 04/06/2017    CHOLHDL 37.4 04/06/2017    TOTALCHOLEST 2.7 04/06/2017    ALBUMIN 3.0 (L) 11/29/2017    BILITOT 13.8 (H) 11/29/2017     (H) 11/29/2017     (H) 11/29/2017    ALKPHOS 253 (H) 11/29/2017    LABPROT 15.4 (H) 11/29/2017    INR 1.5 (H) 11/29/2017        Diagnostics: EMR reviewed      Assessment/Plan:   56yo male with decompensated hepatitis C and alcoholic cirrhosis complicated by HCC.  Patient is experiencing worsening decompensation in the setting of prior Y-90 treatment for multifocal HCC.    Transplant candidacy:  Patient is not transplant candidate at this time due to extent of malignancy.  Initial tumor burden with 8.0cm lesion and concern for infiltrative disease and now with tumor thrombus.      HCC:  Patient has undergone Y-90 x 2 (5/2017, 10/2017).  Patient has had suboptimal response to Y-90, although surveillance imaging obtained earlier due to precipitous decline.  Shows worsening disease.  Discussed with patient and family that at this time, HCC is incurable.  Despite this knowledge, he continues to desire aggressive medical care.  Therefore referral to oncology to discuss candidacy for sorafenib.  Did discuss with patient that he is likely not to be a candidate based on the decompensation of his liver disease but we will certainly proceed with consultation to explore if any other systemic options for treatment are available.  They would like to schedule locally in [place].      Volume overload: Fluid status improved, continuing to hold diuretics at this time based on worsening renal function     Hepatitis C: not a candidate for treatment based on extent of HCC and limited life expectancy    Disability: Patient is beginning to experience significant disability associated with cirrhosis and liver cancer.  Reasonable to proceed with disability for work at this time    RTC in approximately 6-8 weeks     Sigrid oNlan MD     [facility] - [phone] Luis Alberto (may call if you can't reach patient)      [facility] Patient Status  Functional Status: 40% - Disabled: requires special care and assistance  Physical Capacity: No Limitations    Outside Records Request: none

## 2017-12-04 NOTE — TELEPHONE ENCOUNTER
----- Message from Patricia Aguilar sent at 12/4/2017  3:34 PM CST -----  Contact: [phone]  Would like to schedule appt with Hematology. Please call pt charlie at [phone] thx lj

## 2017-12-04 NOTE — PATIENT INSTRUCTIONS
We will refer you to oncology for consideration of further treatment of liver cancer.    Continue to hold fluid pills.    Return to clinic in 1 month.

## 2017-12-04 NOTE — PROGRESS NOTES
Subjective:       Patient ID: Martin Houston is a 57 y.o. male.    Chief Complaint: Cancer    Patient here for follow up of his hepatocellular carcinoma recently treated with radioembolization on 10/5/2017. He reports not feeling well. He tells me 2.5 weeks ago he began to have pain in his lower back that radiated around his waist bilaterally. He says he was unable to sleep due to the pain. His feet began to swell and his throat felt dry. He was hospitalized and discharged last week for dehydration and acute kidney injury. He is accompanied by his family. He continues to complain of lower back pain. He tells me the pain pills he was prescribed sometimes do not help. He had an MRI this morning.       Review of Systems   Constitutional: Positive for activity change, appetite change and fatigue. Negative for chills and fever.   Respiratory: Negative for cough, shortness of breath, wheezing and stridor.    Cardiovascular: Positive for leg swelling. Negative for chest pain and palpitations.   Gastrointestinal: Positive for abdominal pain. Negative for abdominal distention, constipation, diarrhea, nausea and vomiting.   Musculoskeletal: Positive for back pain.       Objective:      Physical Exam   Constitutional: He appears well-developed. No distress.   Eyes: Scleral icterus is present.   Cardiovascular: Normal rate, regular rhythm and normal heart sounds.  Exam reveals no gallop and no friction rub.    No murmur heard.  Pulmonary/Chest: Effort normal and breath sounds normal. No respiratory distress. He has no wheezes. He has no rales.   Abdominal: Soft. Bowel sounds are normal. He exhibits no distension and no mass. There is tenderness in the right upper quadrant. There is no rebound and no guarding.   Neurological: Gait normal.   Skin: Skin is warm. He is not diaphoretic.   Vitals reviewed.      Assessment:       1. HCC (hepatocellular carcinoma)        Plan:         Reviewed findings from MRI with patient.  Explained large lesion appears treated, however, there are two additional lesions noted. There are also numerous lesions in his liver that are concerning for HCC. Explained that there is also tumor invasion of the portal vein. Showed patient rendering of portal vein in liver. I will call patient tomorrow after reviewing with Dr. Magallanes to discuss his recommendations. I explain I am not sure that he is a candidate for any further locoregional therapy, but I will confirm with Dr. Magallanes tomorrow. Patient tells me he has an appointment with Dr. Nolan today also. Encouraged patient to keep that appointment. Clinic phone number provided.

## 2017-12-05 ENCOUNTER — TELEPHONE (OUTPATIENT)
Dept: INTERVENTIONAL RADIOLOGY/VASCULAR | Facility: HOSPITAL | Age: 57
End: 2017-12-05

## 2017-12-05 NOTE — TELEPHONE ENCOUNTER
Attempted to call patient's cell phone. VM not set up. Called phone number for his mother. Left message with Ms. Samuel for Mr. Houston to call clinic: [phone]

## 2017-12-09 ENCOUNTER — HOSPITAL ENCOUNTER (INPATIENT)
Facility: HOSPITAL | Age: 57
LOS: 1 days | Discharge: HOSPICE | DRG: 683 | End: 2017-12-10
Attending: EMERGENCY MEDICINE | Admitting: EMERGENCY MEDICINE
Payer: MEDICAID

## 2017-12-09 DIAGNOSIS — R17 SERUM TOTAL BILIRUBIN ELEVATED: ICD-10-CM

## 2017-12-09 DIAGNOSIS — K74.60 CIRRHOSIS: ICD-10-CM

## 2017-12-09 DIAGNOSIS — K74.60 CIRRHOSIS OF LIVER WITH ASCITES, UNSPECIFIED HEPATIC CIRRHOSIS TYPE: ICD-10-CM

## 2017-12-09 DIAGNOSIS — R18.8 CIRRHOSIS OF LIVER WITH ASCITES, UNSPECIFIED HEPATIC CIRRHOSIS TYPE: ICD-10-CM

## 2017-12-09 DIAGNOSIS — C22.0 HCC (HEPATOCELLULAR CARCINOMA): Primary | ICD-10-CM

## 2017-12-09 DIAGNOSIS — R06.02 SOB (SHORTNESS OF BREATH): ICD-10-CM

## 2017-12-09 DIAGNOSIS — N17.9 AKI (ACUTE KIDNEY INJURY): ICD-10-CM

## 2017-12-09 DIAGNOSIS — R17 JAUNDICE: ICD-10-CM

## 2017-12-09 LAB
ALBUMIN SERPL BCP-MCNC: 2.3 G/DL
ALP SERPL-CCNC: 358 U/L
ALT SERPL W/O P-5'-P-CCNC: 160 U/L
AMMONIA PLAS-SCNC: 21 UMOL/L
ANION GAP SERPL CALC-SCNC: 15 MMOL/L
AST SERPL-CCNC: 334 U/L
BACTERIA #/AREA URNS HPF: ABNORMAL /HPF
BASOPHILS # BLD AUTO: 0.02 K/UL
BASOPHILS NFR BLD: 0.2 %
BILIRUB SERPL-MCNC: 34.9 MG/DL
BILIRUB UR QL STRIP: ABNORMAL
BNP SERPL-MCNC: 111 PG/ML
BUN SERPL-MCNC: 73 MG/DL
CALCIUM SERPL-MCNC: 9.5 MG/DL
CHLORIDE SERPL-SCNC: 98 MMOL/L
CLARITY UR: ABNORMAL
CO2 SERPL-SCNC: 23 MMOL/L
COLOR UR: ABNORMAL
CREAT SERPL-MCNC: 3.3 MG/DL
DIFFERENTIAL METHOD: ABNORMAL
EOSINOPHIL # BLD AUTO: 0.1 K/UL
EOSINOPHIL NFR BLD: 0.6 %
ERYTHROCYTE [DISTWIDTH] IN BLOOD BY AUTOMATED COUNT: 19.6 %
EST. GFR  (AFRICAN AMERICAN): 22 ML/MIN/1.73 M^2
EST. GFR  (NON AFRICAN AMERICAN): 19 ML/MIN/1.73 M^2
GLUCOSE SERPL-MCNC: 74 MG/DL
GLUCOSE UR QL STRIP: ABNORMAL
GRAN CASTS #/AREA URNS LPF: 30 /LPF
HCT VFR BLD AUTO: 35.7 %
HGB BLD-MCNC: 12.6 G/DL
HGB UR QL STRIP: ABNORMAL
HYALINE CASTS #/AREA URNS LPF: 10 /LPF
INR PPP: 1.7
KETONES UR QL STRIP: ABNORMAL
LEUKOCYTE ESTERASE UR QL STRIP: ABNORMAL
LYMPHOCYTES # BLD AUTO: 0.9 K/UL
LYMPHOCYTES NFR BLD: 8 %
MCH RBC QN AUTO: 29.5 PG
MCHC RBC AUTO-ENTMCNC: 35.3 G/DL
MCV RBC AUTO: 84 FL
MICROSCOPIC COMMENT: ABNORMAL
MONOCYTES # BLD AUTO: 1.4 K/UL
MONOCYTES NFR BLD: 12.7 %
NEUTROPHILS # BLD AUTO: 8.8 K/UL
NEUTROPHILS NFR BLD: 78.5 %
NITRITE UR QL STRIP: ABNORMAL
PH UR STRIP: ABNORMAL [PH] (ref 5–8)
PLATELET # BLD AUTO: 267 K/UL
PMV BLD AUTO: 9.8 FL
POTASSIUM SERPL-SCNC: 4.6 MMOL/L
PROT SERPL-MCNC: 7.1 G/DL
PROT UR QL STRIP: ABNORMAL
PROTHROMBIN TIME: 17.3 SEC
RBC # BLD AUTO: 4.27 M/UL
RBC #/AREA URNS HPF: 5 /HPF (ref 0–4)
SODIUM SERPL-SCNC: 136 MMOL/L
SP GR UR STRIP: ABNORMAL (ref 1–1.03)
TROPONIN I SERPL DL<=0.01 NG/ML-MCNC: 0.01 NG/ML
TROPONIN I SERPL DL<=0.01 NG/ML-MCNC: 0.02 NG/ML
URN SPEC COLLECT METH UR: ABNORMAL
UROBILINOGEN UR STRIP-ACNC: ABNORMAL EU/DL
WBC # BLD AUTO: 11.16 K/UL
WBC #/AREA URNS HPF: 20 /HPF (ref 0–5)

## 2017-12-09 PROCEDURE — 85610 PROTHROMBIN TIME: CPT

## 2017-12-09 PROCEDURE — 81000 URINALYSIS NONAUTO W/SCOPE: CPT

## 2017-12-09 PROCEDURE — 21400001 HC TELEMETRY ROOM

## 2017-12-09 PROCEDURE — 93010 ELECTROCARDIOGRAM REPORT: CPT | Mod: ,,, | Performed by: INTERNAL MEDICINE

## 2017-12-09 PROCEDURE — 82140 ASSAY OF AMMONIA: CPT

## 2017-12-09 PROCEDURE — 83880 ASSAY OF NATRIURETIC PEPTIDE: CPT

## 2017-12-09 PROCEDURE — 96360 HYDRATION IV INFUSION INIT: CPT

## 2017-12-09 PROCEDURE — 25000003 PHARM REV CODE 250: Performed by: EMERGENCY MEDICINE

## 2017-12-09 PROCEDURE — 85025 COMPLETE CBC W/AUTO DIFF WBC: CPT

## 2017-12-09 PROCEDURE — 80053 COMPREHEN METABOLIC PANEL: CPT

## 2017-12-09 PROCEDURE — 36415 COLL VENOUS BLD VENIPUNCTURE: CPT

## 2017-12-09 PROCEDURE — 99285 EMERGENCY DEPT VISIT HI MDM: CPT | Mod: 25

## 2017-12-09 PROCEDURE — 84484 ASSAY OF TROPONIN QUANT: CPT | Mod: 91

## 2017-12-09 RX ORDER — TRAMADOL HYDROCHLORIDE 50 MG/1
50 TABLET ORAL EVERY 12 HOURS PRN
Status: DISCONTINUED | OUTPATIENT
Start: 2017-12-09 | End: 2017-12-10

## 2017-12-09 RX ORDER — ONDANSETRON 2 MG/ML
4 INJECTION INTRAMUSCULAR; INTRAVENOUS EVERY 6 HOURS PRN
Status: DISCONTINUED | OUTPATIENT
Start: 2017-12-09 | End: 2017-12-10 | Stop reason: HOSPADM

## 2017-12-09 RX ORDER — SODIUM CHLORIDE 9 MG/ML
500 INJECTION, SOLUTION INTRAVENOUS
Status: COMPLETED | OUTPATIENT
Start: 2017-12-09 | End: 2017-12-09

## 2017-12-09 RX ORDER — ASPIRIN 325 MG
325 TABLET ORAL
Status: COMPLETED | OUTPATIENT
Start: 2017-12-09 | End: 2017-12-09

## 2017-12-09 RX ORDER — NADOLOL 20 MG/1
20 TABLET ORAL DAILY
Status: DISCONTINUED | OUTPATIENT
Start: 2017-12-10 | End: 2017-12-10 | Stop reason: HOSPADM

## 2017-12-09 RX ORDER — LACTULOSE 10 G/15ML
20 SOLUTION ORAL 3 TIMES DAILY
Status: DISCONTINUED | OUTPATIENT
Start: 2017-12-09 | End: 2017-12-10 | Stop reason: HOSPADM

## 2017-12-09 RX ORDER — FAMOTIDINE 20 MG/1
20 TABLET, FILM COATED ORAL DAILY
Status: DISCONTINUED | OUTPATIENT
Start: 2017-12-09 | End: 2017-12-10

## 2017-12-09 RX ADMIN — TRAMADOL HYDROCHLORIDE 50 MG: 50 TABLET, COATED ORAL at 10:12

## 2017-12-09 RX ADMIN — ASPIRIN 325 MG ORAL TABLET 325 MG: 325 PILL ORAL at 11:12

## 2017-12-09 RX ADMIN — LACTULOSE 20 G: 20 SOLUTION ORAL at 10:12

## 2017-12-09 RX ADMIN — SODIUM CHLORIDE 500 ML: 0.9 INJECTION, SOLUTION INTRAVENOUS at 04:12

## 2017-12-09 RX ADMIN — FAMOTIDINE 20 MG: 20 TABLET, FILM COATED ORAL at 06:12

## 2017-12-09 NOTE — SUBJECTIVE & OBJECTIVE
Past Medical History:   Diagnosis Date    Anemia     Bone spur of foot     patient bone spurs removed    Cirrhosis     Fracture     Right tibia/fibula    Gallstones     GERD (gastroesophageal reflux disease)     Hernia     patient reports 2 herina in the groin area    Personal history of kidney stones        Past Surgical History:   Procedure Laterality Date    TONSILLECTOMY         Review of patient's allergies indicates:  No Known Allergies    No current facility-administered medications on file prior to encounter.      Current Outpatient Prescriptions on File Prior to Encounter   Medication Sig    ascorbic acid, vitamin C, (VITAMIN C) 500 MG tablet Take 500 mg by mouth once daily.    lactulose (CHRONULAC) 20 gram/30 mL Soln Take 30 mLs (20 g total) by mouth 3 (three) times daily. May reduce dose for diarrhea.    multivitamin-zinc gluconate (SOURCECF PED VITAMIN WITH ZINC) 5 mg/mL Drop Take by mouth.    nadolol (CORGARD) 20 MG tablet Take 1 tablet (20 mg total) by mouth once daily.    omeprazole (PRILOSEC) 20 MG capsule Take 20 mg by mouth once daily.    ondansetron (ZOFRAN-ODT) 4 MG TbDL Take 1 tablet (4 mg total) by mouth every 8 (eight) hours as needed.    traMADol (ULTRAM) 50 mg tablet Take 1 tablet (50 mg total) by mouth every 12 (twelve) hours as needed for Pain.    UNABLE TO FIND Take 30 mLs by mouth 4 (four) times daily as needed (Dry mouth). medication name: Biotine Mouth Wash     Family History     Problem Relation (Age of Onset)    Cancer Mother    Hypertension Father    Stroke Father        Social History Main Topics    Smoking status: Current Every Day Smoker     Packs/day: 1.00     Years: 45.00     Types: Cigarettes    Smokeless tobacco: Never Used      Comment: Patient was enrolled in the smoking cessation program at  [facility] howard Silva.    Alcohol use No      Comment: stopped drinking 15 years ago    Drug use: No      Comment: stopped 15 years ago ( Cocaine and marijuana))     Sexual activity: Not on file     Review of Systems   Constitutional: Positive for activity change, appetite change and fatigue. Negative for chills, diaphoresis, fever and unexpected weight change.   HENT: Negative for congestion, nosebleeds, postnasal drip, rhinorrhea, sinus pain, sinus pressure, sore throat and trouble swallowing.    Eyes: Negative for photophobia, discharge and visual disturbance.   Respiratory: Negative for cough, choking, chest tightness, shortness of breath and wheezing.    Cardiovascular: Negative for chest pain, palpitations and leg swelling.   Gastrointestinal: Positive for abdominal pain. Negative for abdominal distention, constipation, diarrhea, nausea and vomiting.   Endocrine: Negative for cold intolerance and heat intolerance.   Genitourinary: Negative for difficulty urinating, discharge, flank pain, frequency, penile pain and urgency.   Musculoskeletal: Negative for arthralgias, back pain, myalgias and neck pain.   Skin: Negative for color change, pallor, rash and wound.   Allergic/Immunologic: Negative for environmental allergies, food allergies and immunocompromised state.   Neurological: Negative for dizziness, seizures, syncope, weakness, light-headedness, numbness and headaches.   Hematological: Negative for adenopathy. Does not bruise/bleed easily.   Psychiatric/Behavioral: Negative for agitation, confusion and hallucinations. The patient is not nervous/anxious.      Objective:     Vital Signs (Most Recent):  Temp: 97.5 °F (36.4 °C) (12/09/17 1010)  Pulse: 67 (12/09/17 1601)  Resp: (!) 31 (12/09/17 1601)  BP: (!) 100/58 (12/09/17 1601)  SpO2: 99 % (12/09/17 1601) Vital Signs (24h Range):  Temp:  [97.5 °F (36.4 °C)] 97.5 °F (36.4 °C)  Pulse:  [63-78] 67  Resp:  [17-31] 31  SpO2:  [94 %-99 %] 99 %  BP: (100-112)/(58-65) 100/58     Weight: 80.3 kg (177 lb 0.5 oz)  Body mass index is 25.4 kg/m².    Physical Exam   Constitutional: He is oriented to person, place, and time. He appears  cachectic. He appears ill. No distress.   HENT:   Head: Normocephalic and atraumatic.   Eyes: Pupils are equal, round, and reactive to light. Right eye exhibits no discharge. Left eye exhibits no discharge. Scleral icterus is present.   Neck: Normal range of motion. Neck supple.   Cardiovascular: Normal rate, regular rhythm and normal heart sounds.    Pulses:       Dorsalis pedis pulses are 1+ on the right side, and 1+ on the left side.        Posterior tibial pulses are 1+ on the right side, and 1+ on the left side.   Pulmonary/Chest: Effort normal and breath sounds normal. No respiratory distress. He has no wheezes. He has no rales. He exhibits no tenderness.   SOB/tires easily with talking   Abdominal: Soft. Bowel sounds are normal. He exhibits distension. He exhibits no mass. There is tenderness. There is no rebound and no guarding. No hernia.   Genitourinary:   Genitourinary Comments: deferred   Musculoskeletal: Normal range of motion. He exhibits edema.   Neurological: He is alert and oriented to person, place, and time.   Skin: Skin is warm and dry. He is not diaphoretic.   jaundice   Psychiatric: He has a normal mood and affect. His behavior is normal.         CRANIAL NERVES     CN III, IV, VI   Pupils are equal, round, and reactive to light.       Significant Labs:   Recent Lab Results       12/09/17  1411 12/09/17  1144 12/09/17  1055      Albumin   2.3(L)     Alkaline Phosphatase   358(H)     ALT   160(H)     Ammonia  21      Anion Gap   15     AST   334(H)     Baso #   0.02     Basophil%   0.2     Total Bilirubin   34.9  Comment:  For infants and newborns, interpretation of results should be based  on gestational age, weight and in agreement with clinical  observations.  Premature Infant recommended reference ranges:  Up to 24 hours.............<8.0 mg/dL  Up to 48 hours............<12.0 mg/dL  3-5 days..................<15.0 mg/dL  6-29 days.................<15.0 mg/dL  (H)     BNP    111  Comment:  Values of less than 100 pg/ml are consistent with non-CHF populations.(H)     BUN, Bld   73(H)     Calcium   9.5     Chloride   98     CO2   23     Creatinine   3.3(H)     Differential Method   Automated     eGFR if    22(A)     eGFR if non    19  Comment:  Calculation used to obtain the estimated glomerular filtration  rate (eGFR) is the CKD-EPI equation.   (A)     Eos #   0.1     Eosinophil%   0.6     Glucose   74     Gran #   8.8(H)     Gran%   78.5(H)     Hematocrit   35.7(L)     Hemoglobin   12.6(L)     Coumadin Monitoring INR  1.7  Comment:  Coumadin Therapy:  2.0 - 3.0 for INR for all indicators except mechanical heart valves  and antiphospholipid syndromes which should use 2.5 - 3.5.  (H)      Lymph #   0.9(L)     Lymph%   8.0(L)     MCH   29.5     MCHC   35.3     MCV   84     Mono #   1.4(H)     Mono%   12.7     MPV   9.8     Platelets   267     Potassium   4.6     Total Protein   7.1     Protime  17.3(H)      RBC   4.27(L)     RDW   19.6(H)     Sodium   136     Troponin I 0.016  Comment:  The reference interval for Troponin I represents the 99th percentile   cutoff   for our facility and is consistent with 3rd generation assay   performance.    0.013  Comment:  The reference interval for Troponin I represents the 99th percentile   cutoff   for our facility and is consistent with 3rd generation assay   performance.       WBC   11.16           Significant Imaging:   Imaging Results          US Abdomen Pelvis Doppler Study Limited (Final result)  Result time 12/09/17 16:58:52    Final result by Charissa Cai MD (12/09/17 16:58:52)                 Impression:     Cirrhotic appearing liver with masses as described on previous studies.  Ascites.  Gallbladder wall thickening with sludge and small stones.  The main portal vein appears thrombosed with collateral flow.      Electronically signed by: CHARISSA CAI MD  Date:     12/09/17  Time:    16:58               Narrative:    Exam: Abdominal Doppler.    History: Cirrhosis.  Splenomegaly.  Hepatocellular carcinoma status post Y90    Findings: The liver is lobular and diffusely heterogeneous with several masses measuring up to 6.3 x 5.8 x 6.3 cm in diameter.  There is a moderate amount of perihepatic ascites.  The gallbladder wall is thickened measuring 4.2 mm with some sludge and several tiny stones.  Common bile duct is not identified.  There appears to be thrombus in the main portal vein with collateral flow.                             X-Ray Chest AP Portable (Final result)  Result time 12/09/17 11:16:44    Final result by Charissa Horton Jr., MD (12/09/17 11:16:44)                 Impression:     No acute cardiopulmonary disease.      Electronically signed by: CHARISSA HORTON  Date:     12/09/17  Time:    11:16              Narrative:    Exam: Portable chest radiograph    History:    Shortness of breath    Comparison:/27/2017.    Findings: Expiratory study accentuates lung markings.  Discoid atelectasis in the left midlung.  No consolidation or effusion.  The cardiac silhouette and mediastinum are within normal limits.  No pneumothorax.

## 2017-12-09 NOTE — HPI
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  Patient recently discharged from [facility] on 11/29/17 with similar symptoms in addition to ascites s/p paracentesis neg for SBP.  Symptoms are constant and moderate in severity.  No mitigating or exacerbating factors reported. Associated sxs include anorexia, worsening abd pain, generalized weakness, SOB and dry mouth. Patient denies fever, chills, cough, chest pain, palpitations, N/V, leg pain or swelling. ER workup revealed decompensated liver failure and acute renal failure. ER tx included  ml x 1 and  mg x 1.  Hospital Medicine consulted for inpatient admission.  Patient last seen by Dr. Nolan on 12/4/17 and was found to have progression of HCC with tumor thrombus despite treatment with Y-90 and was determined patient was no longer candidate for further locoregional therapy. Patient's father states patient is referred to Oncology for palliative therapy, states "it's a pill he has to take but it's not a cure". Father states Dr. Nolan told us " there is nothing more they could do, the cancer has spread throughout the live and  is aggressive".  "

## 2017-12-09 NOTE — ASSESSMENT & PLAN NOTE
- likely due to volume depletion secondary to poor intake  - IV NS 500ml given in Ed  - continue IVF  - Daily BMP

## 2017-12-09 NOTE — ED PROVIDER NOTES
SCRIBE #1 NOTE: I, Connie Loredo, am scribing for, and in the presence of, Rico Shanks Jr., MD. I have scribed the entire note.      History      Chief Complaint   Patient presents with    Shortness of Breath     undergoing radiation treatment for liver cancer; reports pain and shortness of breath       Review of patient's allergies indicates:  No Known Allergies     HPI   HPI    12/9/2017, 10:47 AM   History obtained from the patient      History of Present Illness: Martin Houston is a 57 y.o. male patient who presents to the Emergency Department for SOB which onset gradually a few days ago. Pt is undergoing radiation treatment for liver cancer. Pt reports being admitted to the hospital a couple weeks ago. Symptoms are constant and moderate in severity.  No mitigating or exacerbating factors reported. Associated sxs include appetite change and abd pain. Patient denies any fever, leg pain/swelling, Cp, cough, N/V, and all other sxs at this time.  No further complaints or concerns at this time.         Arrival mode: Personal vehicle      PCP: Deana Morillo       Past Medical History:  Past Medical History:   Diagnosis Date    Anemia     Bone spur of foot     patient bone spurs removed    Cirrhosis     Fracture     Right tibia/fibula    Gallstones     GERD (gastroesophageal reflux disease)     Hernia     patient reports 2 herina in the groin area    Personal history of kidney stones        Past Surgical History:  Past Surgical History:   Procedure Laterality Date    TONSILLECTOMY           Family History:  Family History   Problem Relation Age of Onset    Cancer Mother     Stroke Father     Hypertension Father        Social History:  Social History     Social History Main Topics    Smoking status: Current Every Day Smoker     Packs/day: 1.00     Years: 45.00     Types: Cigarettes    Smokeless tobacco: Never Used      Comment: Patient was enrolled in the smoking cessation program at  [facility].    Alcohol use No      Comment: stopped drinking 15 years ago    Drug use: No      Comment: stopped 15 years ago ( Cocaine and marijuana))    Sexual activity: Unknown       ROS   Review of Systems   Constitutional: Positive for appetite change. Negative for fever.   HENT: Negative for sore throat.    Respiratory: Positive for shortness of breath. Negative for cough.    Cardiovascular: Negative for chest pain and leg swelling.   Gastrointestinal: Positive for abdominal pain. Negative for nausea and vomiting.   Genitourinary: Negative for dysuria.   Musculoskeletal: Negative for back pain.        (-) leg pain   Skin: Negative for rash.   Neurological: Negative for weakness.   Hematological: Does not bruise/bleed easily.   All other systems reviewed and are negative.      Physical Exam      Initial Vitals [12/09/17 1010]   BP Pulse Resp Temp SpO2   (!) 111/59 78 (!) 22 97.5 °F (36.4 °C) 95 %      MAP       76.33          Physical Exam  Nursing Notes and Vital Signs Reviewed.  Constitutional: Patient is in no acute distress. Jaundice.  Head: Atraumatic. Normocephalic.  Eyes: PERRL. EOM intact. Conjunctivae are not pale. Scleral icterus.  ENT: Mucous membranes are moist. Oropharynx is clear and symmetric.    Neck: Supple. Full ROM. No lymphadenopathy.  Cardiovascular: Regular rate. Regular rhythm. No murmurs, rubs, or gallops. Distal pulses are 2+ and symmetric.  Pulmonary/Chest: No respiratory distress. Crackles in bases bilaterally. No wheezing.  Abdominal: Soft. Mild abdominal distention. There is no tenderness.  No rebound, guarding, or rigidity.   Musculoskeletal: Moves all extremities. No obvious deformities. No edema.   Skin: Warm and dry.  Neurological:  Alert, awake, and appropriate.  Normal speech.  No acute focal neurological deficits are appreciated.  Psychiatric: Normal affect. Good eye contact. Appropriate in content.    ED Course    Procedures  ED Vital Signs:  Vitals:    12/09/17 1631  "12/09/17 1707 12/09/17 1731 12/09/17 1827   BP: 108/65 124/61 (!) 105/59    Pulse: 62 66 65    Resp: 15 (!) 24 15    Temp:       SpO2: 96% 96% 96%    Weight:       Height:    5' 10.08" (1.78 m)    12/09/17 1831 12/09/17 1916 12/09/17 1946 12/09/17 2049   BP: 101/65 106/61 (!) 104/59 118/68   Pulse: 63 64 63 69   Resp: 12 14 15 (!) 25   Temp:       SpO2: 96% 95% (!) 94% 97%   Weight:       Height:        12/09/17 2051 12/09/17 2140 12/09/17 2200 12/09/17 2305   BP: 111/68 118/67 112/60 106/71   Pulse: 69 64 65 63   Resp: (!) 24 (!) 27 (!) 25 16   Temp: 97.8 °F (36.6 °C) 97 °F (36.1 °C) 98.3 °F (36.8 °C)    SpO2: 96% 97% 95% 96%   Weight:   81 kg (178 lb 9.2 oz)    Height:        12/10/17 0105 12/10/17 0300 12/10/17 0500   BP: (!) 106/48 (!) 98/55    Pulse: 65 71 64   Resp: (!) 24 (!) 28 15   Temp:      SpO2: 95% (!) 93% 95%   Weight:      Height:          Abnormal Lab Results:  Labs Reviewed   CBC W/ AUTO DIFFERENTIAL - Abnormal; Notable for the following:        Result Value    RBC 4.27 (*)     Hemoglobin 12.6 (*)     Hematocrit 35.7 (*)     RDW 19.6 (*)     Gran # 8.8 (*)     Lymph # 0.9 (*)     Mono # 1.4 (*)     Gran% 78.5 (*)     Lymph% 8.0 (*)     All other components within normal limits   COMPREHENSIVE METABOLIC PANEL - Abnormal; Notable for the following:     BUN, Bld 73 (*)     Creatinine 3.3 (*)     Albumin 2.3 (*)     Total Bilirubin 34.9 (*)     Alkaline Phosphatase 358 (*)      (*)      (*)     eGFR if  22 (*)     eGFR if non  19 (*)     All other components within normal limits   B-TYPE NATRIURETIC PEPTIDE - Abnormal; Notable for the following:      (*)     All other components within normal limits   PROTIME-INR - Abnormal; Notable for the following:     Prothrombin Time 17.3 (*)     INR 1.7 (*)     All other components within normal limits   URINALYSIS - Abnormal; Notable for the following:     Color, UA Brown (*)     Appearance, UA Cloudy (*)     " All other components within normal limits   URINALYSIS MICROSCOPIC - Abnormal; Notable for the following:     RBC, UA 5 (*)     WBC, UA 20 (*)     Bacteria, UA Many (*)     Hyaline Casts, UA 10 (*)     Granular Casts, UA 30 (*)     All other components within normal limits   TROPONIN I   TROPONIN I   AMMONIA        All Lab Results:  Results for orders placed or performed during the hospital encounter of 12/09/17   CBC auto differential   Result Value Ref Range    WBC 11.16 3.90 - 12.70 K/uL    RBC 4.27 (L) 4.60 - 6.20 M/uL    Hemoglobin 12.6 (L) 14.0 - 18.0 g/dL    Hematocrit 35.7 (L) 40.0 - 54.0 %    MCV 84 82 - 98 fL    MCH 29.5 27.0 - 31.0 pg    MCHC 35.3 32.0 - 36.0 g/dL    RDW 19.6 (H) 11.5 - 14.5 %    Platelets 267 150 - 350 K/uL    MPV 9.8 9.2 - 12.9 fL    Gran # 8.8 (H) 1.8 - 7.7 K/uL    Lymph # 0.9 (L) 1.0 - 4.8 K/uL    Mono # 1.4 (H) 0.3 - 1.0 K/uL    Eos # 0.1 0.0 - 0.5 K/uL    Baso # 0.02 0.00 - 0.20 K/uL    Gran% 78.5 (H) 38.0 - 73.0 %    Lymph% 8.0 (L) 18.0 - 48.0 %    Mono% 12.7 4.0 - 15.0 %    Eosinophil% 0.6 0.0 - 8.0 %    Basophil% 0.2 0.0 - 1.9 %    Differential Method Automated    Comprehensive metabolic panel   Result Value Ref Range    Sodium 136 136 - 145 mmol/L    Potassium 4.6 3.5 - 5.1 mmol/L    Chloride 98 95 - 110 mmol/L    CO2 23 23 - 29 mmol/L    Glucose 74 70 - 110 mg/dL    BUN, Bld 73 (H) 6 - 20 mg/dL    Creatinine 3.3 (H) 0.5 - 1.4 mg/dL    Calcium 9.5 8.7 - 10.5 mg/dL    Total Protein 7.1 6.0 - 8.4 g/dL    Albumin 2.3 (L) 3.5 - 5.2 g/dL    Total Bilirubin 34.9 (H) 0.1 - 1.0 mg/dL    Alkaline Phosphatase 358 (H) 55 - 135 U/L     (H) 10 - 40 U/L     (H) 10 - 44 U/L    Anion Gap 15 8 - 16 mmol/L    eGFR if African American 22 (A) >60 mL/min/1.73 m^2    eGFR if non African American 19 (A) >60 mL/min/1.73 m^2   Troponin I #1   Result Value Ref Range    Troponin I 0.013 0.000 - 0.026 ng/mL   Troponin I #2   Result Value Ref Range    Troponin I 0.016 0.000 - 0.026 ng/mL    B-Type natriuretic peptide (BNP)   Result Value Ref Range     (H) 0 - 99 pg/mL   Ammonia   Result Value Ref Range    Ammonia 21 10 - 50 umol/L   Protime-INR   Result Value Ref Range    Prothrombin Time 17.3 (H) 9.0 - 12.5 sec    INR 1.7 (H) 0.8 - 1.2   Urinalysis   Result Value Ref Range    Specimen UA Urine, Clean Catch     Color, UA Brown (A) Yellow, Straw, Mayra    Appearance, UA Cloudy (A) Clear    pH, UA SEE COMMENT 5.0 - 8.0    Specific Gravity, UA SEE COMMENT 1.005 - 1.030    Protein, UA SEE COMMENT Negative    Glucose, UA SEE COMMENT Negative    Ketones, UA SEE COMMENT Negative    Bilirubin (UA) SEE COMMENT Negative    Occult Blood UA SEE COMMENT Negative    Nitrite, UA SEE COMMENT Negative    Urobilinogen, UA SEE COMMENT <2.0 EU/dL    Leukocytes, UA SEE COMMENT Negative   Urinalysis Microscopic   Result Value Ref Range    RBC, UA 5 (H) 0 - 4 /hpf    WBC, UA 20 (H) 0 - 5 /hpf    Bacteria, UA Many (A) None-Occ /hpf    Hyaline Casts, UA 10 (A) 0-1/lpf /lpf    Granular Casts, UA 30 (A) None /lpf    Microscopic Comment SEE COMMENT          Imaging Results:  Imaging Results          US Abdomen Pelvis Doppler Study Limited (Final result)  Result time 12/09/17 16:58:52    Final result by Charissa Cai MD (12/09/17 16:58:52)                 Impression:     Cirrhotic appearing liver with masses as described on previous studies.  Ascites.  Gallbladder wall thickening with sludge and small stones.  The main portal vein appears thrombosed with collateral flow.      Electronically signed by: CHARISSA CAI MD  Date:     12/09/17  Time:    16:58              Narrative:    Exam: Abdominal Doppler.    History: Cirrhosis.  Splenomegaly.  Hepatocellular carcinoma status post Y90    Findings: The liver is lobular and diffusely heterogeneous with several masses measuring up to 6.3 x 5.8 x 6.3 cm in diameter.  There is a moderate amount of perihepatic ascites.  The gallbladder wall is thickened measuring 4.2 mm with some  sludge and several tiny stones.  Common bile duct is not identified.  There appears to be thrombus in the main portal vein with collateral flow.                             X-Ray Chest AP Portable (Final result)  Result time 12/09/17 11:16:44    Final result by Charissa Hood Jr., MD (12/09/17 11:16:44)                 Impression:     No acute cardiopulmonary disease.      Electronically signed by: CHARISSA HOOD  Date:     12/09/17  Time:    11:16              Narrative:    Exam: Portable chest radiograph    History:    Shortness of breath    Comparison:/27/2017.    Findings: Expiratory study accentuates lung markings.  Discoid atelectasis in the left midlung.  No consolidation or effusion.  The cardiac silhouette and mediastinum are within normal limits.  No pneumothorax.                             The EKG was ordered, reviewed, and independently interpreted by the ED provider.  Interpretation time: 1034  Rate: 66 BPM  Rhythm: normal sinus rhythm  Interpretation: ST and T wave abnormality, consider anterior ischemia. No STEMI.               The Emergency Provider reviewed the vital signs and test results, which are outlined above.    ED Discussion     2:54 PM: Discussed case with ADILENE Mtz ([facility]) who recommends talking to GI to see if he needs to be transferred to [place] for possible hepatology follow-up.     3:45 PM: Dr. Shanks discussed the pt's case with Dr. Jones () who recommends a RUQ ultrasound to rule out any cholelithiasis and will see pt in the hospital.    3:48 PM: Discussed case with ADILENE Mtz  ([facility]). Dr. Anderson agrees with current care and management of pt and accepts admission.   Admitting Service: [facility]   Admitting Physician: Dr. Anderson  Admit to: Tele    3:48 PM: Re-evaluated pt. I have discussed test results, shared treatment plan, and the need for admission with patient and family at bedside. Pt and family express  understanding at this time and agree with all information. All questions answered. Pt and family have no further questions or concerns at this time. Pt is ready for admit.          ED Medication(s):  Medications   traMADol tablet 50 mg (50 mg Oral Given 12/9/17 2217)   nadolol tablet 20 mg (20 mg Oral Given 12/10/17 0939)   lactulose 20 gram/30 mL solution Soln 20 g (20 g Oral Given 12/10/17 0555)   ondansetron injection 4 mg (not administered)   pneumoc 13-kelsi conj-dip cr(PF) 0.5 mL (not administered)   influenza (FLUZONE,FLUARIX QUADRIVALENT) vaccine 0.5 mL (not administered)   pantoprazole injection 40 mg (40 mg Intravenous Given 12/10/17 0939)   aspirin tablet 325 mg (325 mg Oral Given 12/9/17 1140)   0.9%  NaCl infusion (0 mLs Intravenous Stopped 12/9/17 1700)       Current Discharge Medication List                Medical Decision Making    Medical Decision Making:   Clinical Tests:   Lab Tests: Ordered and Reviewed  Radiological Study: Ordered and Reviewed  Medical Tests: Ordered and Reviewed           Scribe Attestation:   Scribe #1: I performed the above scribed service and the documentation accurately describes the services I performed. I attest to the accuracy of the note.    Attending:   Physician Attestation Statement for Scribe #1: I, Rico Shanks Jr., MD, personally performed the services described in this documentation, as scribed by Connie Loredo, in my presence, and it is both accurate and complete.          Clinical Impression       ICD-10-CM ICD-9-CM   1. SHANNAN (acute kidney injury) N17.9 584.9   2. SOB (shortness of breath) R06.02 786.05   3. Jaundice R17 782.4   4. Cirrhosis of liver with ascites, unspecified hepatic cirrhosis type K74.60 571.5   5. Serum total bilirubin elevated R17 277.4   6. Cirrhosis K74.60 571.5       Disposition:   Disposition: Admitted  Condition: Fair         Rico Shanks Jr., MD  12/10/17 4939

## 2017-12-09 NOTE — ASSESSMENT & PLAN NOTE
-Patient last seen by Dr. Nolan on 12/4/17 and was found to have progression of HCC with tumor thrombus despite treatment with Y-90 and no longer candidate for further locoregional therapy at this time. Refer to oncology for palliative treatment only  -//Tbil 358/ ammonia 21  -supportive care at this time,  discussed palliative care/DNR status with patient and family, awaiting decision.

## 2017-12-10 VITALS
WEIGHT: 178.56 LBS | HEART RATE: 63 BPM | TEMPERATURE: 98 F | SYSTOLIC BLOOD PRESSURE: 98 MMHG | BODY MASS INDEX: 25.56 KG/M2 | DIASTOLIC BLOOD PRESSURE: 47 MMHG | OXYGEN SATURATION: 96 % | RESPIRATION RATE: 15 BRPM | HEIGHT: 70 IN

## 2017-12-10 PROCEDURE — 63600175 PHARM REV CODE 636 W HCPCS: Performed by: EMERGENCY MEDICINE

## 2017-12-10 PROCEDURE — 25000003 PHARM REV CODE 250: Performed by: EMERGENCY MEDICINE

## 2017-12-10 PROCEDURE — 99499 UNLISTED E&M SERVICE: CPT | Mod: ,,, | Performed by: INTERNAL MEDICINE

## 2017-12-10 PROCEDURE — C9113 INJ PANTOPRAZOLE SODIUM, VIA: HCPCS | Performed by: EMERGENCY MEDICINE

## 2017-12-10 RX ORDER — HYDROCODONE BITARTRATE AND ACETAMINOPHEN 7.5; 325 MG/15ML; MG/15ML
15 SOLUTION ORAL EVERY 4 HOURS PRN
Status: DISCONTINUED | OUTPATIENT
Start: 2017-12-10 | End: 2017-12-10 | Stop reason: HOSPADM

## 2017-12-10 RX ORDER — PANTOPRAZOLE SODIUM 40 MG/10ML
40 INJECTION, POWDER, LYOPHILIZED, FOR SOLUTION INTRAVENOUS 2 TIMES DAILY
Status: DISCONTINUED | OUTPATIENT
Start: 2017-12-10 | End: 2017-12-10 | Stop reason: HOSPADM

## 2017-12-10 RX ADMIN — LACTULOSE 20 G: 20 SOLUTION ORAL at 05:12

## 2017-12-10 RX ADMIN — HYDROCODONE BITARTRATE AND ACETAMINOPHEN 15 ML: 7.5; 325 SOLUTION ORAL at 01:12

## 2017-12-10 RX ADMIN — PANTOPRAZOLE SODIUM 40 MG: 40 INJECTION, POWDER, FOR SOLUTION INTRAVENOUS at 09:12

## 2017-12-10 RX ADMIN — TRAMADOL HYDROCHLORIDE 50 MG: 50 TABLET, COATED ORAL at 10:12

## 2017-12-10 RX ADMIN — NADOLOL 20 MG: 20 TABLET ORAL at 09:12

## 2017-12-10 NOTE — PLAN OF CARE
Assessment completed with family. Family and patient agreed for hospise and requested [facility] with inpatient. CM notified Delaney with [facility]. Preference form signed , copy given and original to blue folder. [facility] has been accepted. Patient to transfer to inpatient the [facility].     12/10/17 1323   Discharge Assessment   Assessment Type Discharge Planning Assessment   Confirmed/corrected address and phone number on facesheet? Yes   Assessment information obtained from? Caregiver;Medical Record   Expected Length of Stay (days) (TBD)   Communicated expected length of stay with patient/caregiver no   Prior to hospitilization cognitive status: Unable to Assess   Current cognitive status: Unable to Assess   Lives With alone   Able to Return to Prior Arrangements no   Is patient able to care for self after discharge? No   Patient's perception of discharge disposition hospice/home   Readmission Within The Last 30 Days unable to assess   Patient currently being followed by outpatient case management? No   Patient currently receives any other outside agency services? No   Equipment Currently Used at Home none   Do you have any problems affording any of your prescribed medications? No   Is the patient taking medications as prescribed? yes   Does the patient have transportation home? Yes   Transportation Available family or friend will provide   Discharge Plan A Inpatient Hospice   Discharge Plan B Inpatient Hospice   Patient/Family In Agreement With Plan yes

## 2017-12-10 NOTE — PROGRESS NOTES
EMS at bedside. Pt given pain medicine prior to transport. Attempted to call the crossing to notify that pt is in route. Nurse, Jasmyne, with [facility]issa notified.

## 2017-12-10 NOTE — CONSULTS
[facility] -   Gastroenterology  Consult Note    Patient Name: Martin Houston  MRN: [id number]  Admission Date: 12/9/2017  Hospital Length of Stay: 1 days  Code Status: Full Code   Attending Provider: Tabitha Anderson MD   Consulting Provider: Werner Shepherd MD  Primary Care Physician: Deana Morillo  Principal Problem:SHANNAN (acute kidney injury)    Inpatient consult to Gastroenterology  Consult performed by: WERNER SHEPHERD.  Consult ordered by: JENNIFER GRECO JR  Reason for consult: Cirrhosis/HCC  Assessment/Recommendations: Patient not a candidate for OLTx and nothing to offer from the GI perspective. He will be transferred to hospice care. I discussed with Dr. Anderson.         Subjective:     HPI:  We were consulted to render an opinion about patient's ESLD and metastatic hepatocellular carcinoma. I spoke with Hospital Medicine, Dr. Anderson, and patient will be going to Hospice. Nothing further to add.     Past Medical History:   Diagnosis Date    Anemia     Bone spur of foot     patient bone spurs removed    Cirrhosis     Fracture     Right tibia/fibula    Gallstones     GERD (gastroesophageal reflux disease)     Hernia     patient reports 2 herina in the groin area    Personal history of kidney stones        Past Surgical History:   Procedure Laterality Date    TONSILLECTOMY         Review of patient's allergies indicates:  No Known Allergies  Family History     Problem Relation (Age of Onset)    Cancer Mother    Hypertension Father    Stroke Father        Social History Main Topics    Smoking status: Current Every Day Smoker     Packs/day: 1.00     Years: 45.00     Types: Cigarettes    Smokeless tobacco: Never Used      Comment: Patient was enrolled in the smoking cessation program at  [facility].    Alcohol use No      Comment: stopped drinking 15 years ago    Drug use: No      Comment: stopped 15 years ago ( Cocaine and marijuana))    Sexual activity: Not on file     Review  of Systems   Unable to perform ROS: Other     Objective:     Vital Signs (Most Recent):  Temp: 98.3 °F (36.8 °C) (12/10/17 1120)  Pulse: 63 (12/10/17 1205)  Resp: 15 (12/10/17 1205)  BP: (!) 98/47 (12/10/17 1205)  SpO2: 96 % (12/10/17 1205) Vital Signs (24h Range):  Temp:  [97 °F (36.1 °C)-98.3 °F (36.8 °C)] 98.3 °F (36.8 °C)  Pulse:  [61-74] 63  Resp:  [12-34] 15  SpO2:  [93 %-99 %] 96 %  BP: ()/(45-71) 98/47     Weight: 81 kg (178 lb 9.2 oz) (12/09/17 2200)  Body mass index is 25.56 kg/m².    No intake or output data in the 24 hours ending 12/10/17 1305    Lines/Drains/Airways     Peripheral Intravenous Line                 Peripheral IV - Single Lumen 12/09/17 1056 Right Antecubital 1 day                Physical Exam   Nursing note and vitals reviewed.    We did not perform a physical exam. Patient will be going for Hospice care.     Significant Labs:  CBC:   Recent Labs  Lab 12/09/17  1055   WBC 11.16   HGB 12.6*   HCT 35.7*        Liver Function Test:   Recent Labs  Lab 12/09/17  1055   *   *   ALKPHOS 358*   BILITOT 34.9*   PROT 7.1   ALBUMIN 2.3*       Significant Imaging:  Imaging results within the past 24 hours have been reviewed.    Assessment/Plan:     Cirrhosis    End stage liver disease. Nothing to add.         HCC (hepatocellular carcinoma)    Metastatic disease. Patient is not a candidate for liver transplant and failed embolization of tumor. He has seen Oncology and they could not offer him much.     Going to hospice.             Thank you for your consult. I will sign off. Please contact us if you have any additional questions.    Werner Jones MD  Gastroenterology  [facility] -

## 2017-12-10 NOTE — PLAN OF CARE
12/10/17 1313   Readmission Questionnaire   At the time of your discharge, did someone talk to you about what your health problems were? Yes   At the time of discharge, did someone talk to you about what to watch out for regarding worsening of your health problem? Yes   At the time of discharge, did someone talk to you about what to do if you experienced worsening of your health problem? Yes   At the time of discharge, did someone talk to you about which medication to take when you left the hospital and which ones to stop taking? Yes   At the time of discharge, did someone talk to you about when and where to follow up with a doctor after you left the hospital? Yes   How often do you need to have someone help you when you read instructions, pamphlets, or other written material from your doctor or pharmacy? Often   Do you have problems taking your medications as prescribed? No   Do you have any problems affording any of  your prescribed medications? No   Do you have problems obtaining/receiving your medications? No   Does the patient have transportation to healthcare appointments? Yes   Lives With alone   Living Arrangements house   Does the patient have family/friends to help with healtcare needs after discharge? yes   Does your caregiver provide all the help you need? Yes   Are you currently feeling confused? No   Are you currently having problems thinking? No   Are you currently having memory problems? No   Have you felt down, depressed, or hopeless? 3   Have you felt little interest or pleasure in doing things? 3   In the last 7 days, my sleep quality was: fair

## 2017-12-10 NOTE — DISCHARGE SUMMARY
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  Patient recently discharged from [facility] on 11/29/17 with similar symptoms in addition to ascites s/p paracentesis neg for SBP.  Symptoms are constant and moderate in severity.  No mitigating or exacerbating factors reported. Associated sxs include anorexia, worsening abd pain, generalized weakness, SOB and dry mouth. Patient denies fever, chills, cough, chest pain, palpitations, N/V, leg pain or swelling. ER workup revealed decompensated liver failure and acute renal failure. ER tx included  ml x 1 and  mg x 1.  Hospital Medicine consulted for  inpatient admission.  Patient last seen by Dr. Nolan on 12/4/17 and was found to have progression of HCC with tumor thrombus despite treatment with Y-90 and was determined patient was no longer candidate for further locoregional therapy. Patient's father states patient is referred to Oncology for palliative therapy, states "it's a pill he has to take but it's not a cure". Father states Dr. Nolan told us " there is nothing more they could do, the cancer has spread throughout the live and  is aggressive".    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 " "Associated sxs include anorexia, worsening abd pain, generalized weakness, SOB and dry mouth. ER workup revealed decompensated liver failure and acute renal failure. ER tx included  ml x 1 and  mg x 1.     Patient last seen by Dr. Nolan ( oncologist) on 12/4/17 and was found to have progression of HCC with tumor thrombus despite treatment with Y-90 and was determined patient was no longer candidate for further locoregional therapy. Pt's father states Dr. Nolan told us " there is nothing more they could do, the cancer has spread throughout the live and is aggressive".    Subsequently pt admitted to hospital and gently rehydrated. He had abd pain for which he was given norco elixir. He remained severely jaundice and emaciated and cachectic and obviously terminal. Case was discussed with Dr. Jones-- GI who reiterated that pt is terminal and there is nothing more to offer the pt and that he had advised the ER to refer the pt to hospice.     We d/w the case with pt's parents who are both present here and are his NOK-- he has no wife or children. They understand his terminal condition and just wish to keep him comfortable. They do not want any resuscitation in case of any cardiopulmonary arrest and want to place him in hopce. [facility] was contacted and they accepted him and pt is being discharged to [facility] today.       Consults:   Consults         Status Ordering Provider     Inpatient consult to Gastroenterology  Once     Provider:  Werner Jones MD    Acknowledged JENNIFER GRECO JR     Inpatient consult to Social Work  Once     Provider:  (Not yet assigned)    Acknowledged KRISTAL GREGORIO          No new Assessment & Plan notes have been filed under this hospital service since the last note was generated.  Service: Hospital Medicine    Final Active Diagnoses:    Diagnosis Date Noted POA    PRINCIPAL PROBLEM:  SHANNAN (acute kidney injury) [N17.9] 11/27/2017 Yes    SOB " (shortness of breath) [R06.02] 12/09/2017 Yes    Jaundice [R17] 12/09/2017 Yes    Cirrhosis [K74.60] 12/09/2017 Yes    Serum total bilirubin elevated [R17] 12/09/2017 Yes    HCC (hepatocellular carcinoma) [C22.0] 04/26/2017 Yes      Problems Resolved During this Admission:    Diagnosis Date Noted Date Resolved POA       Discharged Condition: poor    Disposition: Hospice/Home    Follow Up:  Follow-up Information     Deana Morillo.    Specialty:  Radiology               Patient Instructions:     Diet general     Activity as tolerated         Significant Diagnostic Studies: Labs:   BMP:   Recent Labs  Lab 12/09/17  1055   GLU 74      K 4.6   CL 98   CO2 23   BUN 73*   CREATININE 3.3*   CALCIUM 9.5   , CMP   Recent Labs  Lab 12/09/17  1055      K 4.6   CL 98   CO2 23   GLU 74   BUN 73*   CREATININE 3.3*   CALCIUM 9.5   PROT 7.1   ALBUMIN 2.3*   BILITOT 34.9*   ALKPHOS 358*   *   *   ANIONGAP 15   ESTGFRAFRICA 22*   EGFRNONAA 19*   , CBC   Recent Labs  Lab 12/09/17  1055   WBC 11.16   HGB 12.6*   HCT 35.7*      , INR   Lab Results   Component Value Date    INR 1.7 (H) 12/09/2017    INR 1.5 (H) 11/29/2017    INR 1.6 (H) 11/28/2017    and All labs within the past 24 hours have been reviewed    Pending Diagnostic Studies:     None         Medications:  Reconciled Home Medications:   Current Discharge Medication List      CONTINUE these medications which have NOT CHANGED    Details   lactulose (CHRONULAC) 20 gram/30 mL Soln Take 30 mLs (20 g total) by mouth 3 (three) times daily. May reduce dose for diarrhea.  Qty: 2700 mL, Refills: 0      ondansetron (ZOFRAN-ODT) 4 MG TbDL Take 1 tablet (4 mg total) by mouth every 8 (eight) hours as needed.  Qty: 60 tablet, Refills: 5         STOP taking these medications       ascorbic acid, vitamin C, (VITAMIN C) 500 MG tablet Comments:   Reason for Stopping:         multivitamin-zinc gluconate (SOURCECF PED VITAMIN WITH ZINC) 5 mg/mL Drop  Comments:   Reason for Stopping:         nadolol (CORGARD) 20 MG tablet Comments:   Reason for Stopping:         omeprazole (PRILOSEC) 20 MG capsule Comments:   Reason for Stopping:         traMADol (ULTRAM) 50 mg tablet Comments:   Reason for Stopping:         UNABLE TO FIND Comments:   Reason for Stopping:               Indwelling Lines/Drains at time of discharge:   Lines/Drains/Airways          No matching active lines, drains, or airways          Time spent on the discharge of patient: 35 minutes  Patient was seen and examined on the date of discharge and determined to be suitable for discharge.    Critical care time spent on the evaluation and treatment of severe organ dysfunction, review of pertinent labs and imaging studies, discussions with consulting providers and discussions with patient/family: 53 minutes.     Tabitha Anderson MD  Department of Hospital Medicine  [facility]

## 2017-12-10 NOTE — HOSPITAL COURSE
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 Associated sxs include anorexia, worsening abd pain, generalized weakness, SOB and dry mouth. ER workup revealed decompensated liver failure and acute renal failure. ER tx included  ml x 1 and  mg x 1.     Patient last seen by Dr. Nolan ( oncologist) on 12/4/17 and was found to have progression of HCC with tumor thrombus despite treatment with Y-90 and was determined patient was no longer candidate for further locoregional therapy. Pt's father states Dr. Nolan told us " there is nothing more they could do, the cancer has spread throughout the live and is aggressive".    Subsequently pt admitted to hospital and gently rehydrated. He had abd pain for which he was given norco elixir. He remained severely jaundice and emaciated and cachectic and obviously terminal. Case was discussed with Dr. Jones-- GI who reiterated that pt is terminal and there is nothing more to offer the pt and that he had advised the ER to refer the pt to hospice.     We d/w the case with pt's parents who are both present here and are his NOK-- he has no wife or children. They understand his terminal condition and just wish to keep him comfortable. They do not want any resuscitation in case of any cardiopulmonary arrest and want to place him in hopsice. [facility] was contacted and they accepted him and pt is being discharged to [facility] today.    "

## 2017-12-10 NOTE — PROGRESS NOTES
Pharmacist Renal Dose Adjustment Note    Martin Houston is a 57 y.o. male being treated with the medication famotidine.     Patient Data:    Vital Signs (Most Recent):  Temp: 97.5 °F (36.4 °C) (12/09/17 1010)  Pulse: 65 (12/09/17 1731)  Resp: 15 (12/09/17 1731)  BP: (!) 105/59 (12/09/17 1731)  SpO2: 96 % (12/09/17 1731)   Vital Signs (72h Range):  Temp:  [97.5 °F (36.4 °C)]   Pulse:  [62-78]   Resp:  [15-31]   BP: (100-124)/(58-65)   SpO2:  [94 %-99 %]        Recent Labs     Lab 12/09/17  1055   CREATININE 3.3*     Serum creatinine: 3.3 mg/dL High 12/09/17 1055  Estimated creatinine clearance: 25 mL/min    Medication: twice daily dosing will be changed to daily dosing.     Pharmacist's Name: Karely Last  Pharmacist's Extension: [phone]

## 2017-12-10 NOTE — HPI
We were consulted to render an opinion about patient's ESLD and metastatic hepatocellular carcinoma. I spoke with Hospital Medicine, Dr. Anderson, and patient will be going to Hospice. Nothing further to add.

## 2017-12-10 NOTE — PLAN OF CARE
Problem: Patient Care Overview  Goal: Plan of Care Review  Outcome: Ongoing (interventions implemented as appropriate)  Admitted to ICU as a telemetry patient from the ED.  Admitted due to c/o SOB at home.  Noted dyspnea with activity.  Heavily jaundiced due to ESLD.  AAOx4, able to make needs known. Speech difficult to understand at times. Abdomen mildly distended secondary to ascites from liver cirrhosis. Mucus membranes dry and cracked. Voids per urinal, urine concentrated, dark yovani in color.  Receiving lactulose, had one loose dark reddened stool this shift. Able to ambulate to  with assist of one. Medicated for pain x 1 this shift.  NSR on monitor.

## 2017-12-10 NOTE — ASSESSMENT & PLAN NOTE
Metastatic disease. Patient is not a candidate for liver transplant and failed embolization of tumor. He has seen Oncology and they could not offer him much.     Going to hospice.

## 2017-12-10 NOTE — H&P
"[facility] Hospital Medicine  History & Physical    Patient Name: Martin Houston  MRN: [id number]  Admission Date: 12/9/2017  Attending Physician: Rico Shanks Jr., MD   Primary Care Provider: Deana Morillo         Patient information was obtained from patient, parent and ER records.     Subjective:     Principal Problem:SHANNAN (acute kidney injury)    Chief Complaint:   Chief Complaint   Patient presents with    Shortness of Breath     undergoing radiation treatment for liver cancer; reports pain and shortness of breath        HPI:  Martin Houston is a 58 y/o male with  history of ESLD secondary to alcoholic liver disease and chronic hepatitis C complicated by HCC who presented to the Emergency Department for SOB which onset gradually a few days ago.  Patient recently discharged from [facility] on 11/29/17 with similar symptoms in addition to ascites s/p paracentesis neg for SBP.  Symptoms are constant and moderate in severity.  No mitigating or exacerbating factors reported. Associated sxs include anorexia, worsening abd pain, generalized weakness, SOB and dry mouth. Patient denies fever, chills, cough, chest pain, palpitations, N/V, leg pain or swelling. ER workup revealed decompensated liver failure and acute renal failure. ER tx included  ml x 1 and  mg x 1.  Hospital Medicine consulted for  inpatient admission.  Patient last seen by Dr. Nolan on 12/4/17 and was found to have progression of HCC with tumor thrombus despite treatment with Y-90 and was determined patient was no longer candidate for further locoregional therapy. Patient's father states patient is referred to Oncology for palliative therapy, states "it's a pill he has to take but it's not a cure". Father states Dr. Nolan told us " there is nothing more they could do, the cancer has spread throughout the live and  is aggressive".    Past Medical History:   Diagnosis Date    Anemia     Bone spur of foot     " patient bone spurs removed    Cirrhosis     Fracture     Right tibia/fibula    Gallstones     GERD (gastroesophageal reflux disease)     Hernia     patient reports 2 herina in the groin area    Personal history of kidney stones        Past Surgical History:   Procedure Laterality Date    TONSILLECTOMY         Review of patient's allergies indicates:  No Known Allergies    No current facility-administered medications on file prior to encounter.      Current Outpatient Prescriptions on File Prior to Encounter   Medication Sig    ascorbic acid, vitamin C, (VITAMIN C) 500 MG tablet Take 500 mg by mouth once daily.    lactulose (CHRONULAC) 20 gram/30 mL Soln Take 30 mLs (20 g total) by mouth 3 (three) times daily. May reduce dose for diarrhea.    multivitamin-zinc gluconate (SOURCECF PED VITAMIN WITH ZINC) 5 mg/mL Drop Take by mouth.    nadolol (CORGARD) 20 MG tablet Take 1 tablet (20 mg total) by mouth once daily.    omeprazole (PRILOSEC) 20 MG capsule Take 20 mg by mouth once daily.    ondansetron (ZOFRAN-ODT) 4 MG TbDL Take 1 tablet (4 mg total) by mouth every 8 (eight) hours as needed.    traMADol (ULTRAM) 50 mg tablet Take 1 tablet (50 mg total) by mouth every 12 (twelve) hours as needed for Pain.    UNABLE TO FIND Take 30 mLs by mouth 4 (four) times daily as needed (Dry mouth). medication name: Biotine Mouth Wash     Family History     Problem Relation (Age of Onset)    Cancer Mother    Hypertension Father    Stroke Father        Social History Main Topics    Smoking status: Current Every Day Smoker     Packs/day: 1.00     Years: 45.00     Types: Cigarettes    Smokeless tobacco: Never Used      Comment: Patient was enrolled in the smoking cessation program at  [facility] howard SerranoShira.    Alcohol use No      Comment: stopped drinking 15 years ago    Drug use: No      Comment: stopped 15 years ago ( Cocaine and marijuana))    Sexual activity: Not on file     Review of Systems   Constitutional:  Positive for activity change, appetite change and fatigue. Negative for chills, diaphoresis, fever and unexpected weight change.   HENT: Negative for congestion, nosebleeds, postnasal drip, rhinorrhea, sinus pain, sinus pressure, sore throat and trouble swallowing.    Eyes: Negative for photophobia, discharge and visual disturbance.   Respiratory: Negative for cough, choking, chest tightness, shortness of breath and wheezing.    Cardiovascular: Negative for chest pain, palpitations and leg swelling.   Gastrointestinal: Positive for abdominal pain. Negative for abdominal distention, constipation, diarrhea, nausea and vomiting.   Endocrine: Negative for cold intolerance and heat intolerance.   Genitourinary: Negative for difficulty urinating, discharge, flank pain, frequency, penile pain and urgency.   Musculoskeletal: Negative for arthralgias, back pain, myalgias and neck pain.   Skin: Negative for color change, pallor, rash and wound.   Allergic/Immunologic: Negative for environmental allergies, food allergies and immunocompromised state.   Neurological: Negative for dizziness, seizures, syncope, weakness, light-headedness, numbness and headaches.   Hematological: Negative for adenopathy. Does not bruise/bleed easily.   Psychiatric/Behavioral: Negative for agitation, confusion and hallucinations. The patient is not nervous/anxious.      Objective:     Vital Signs (Most Recent):  Temp: 97.5 °F (36.4 °C) (12/09/17 1010)  Pulse: 67 (12/09/17 1601)  Resp: (!) 31 (12/09/17 1601)  BP: (!) 100/58 (12/09/17 1601)  SpO2: 99 % (12/09/17 1601) Vital Signs (24h Range):  Temp:  [97.5 °F (36.4 °C)] 97.5 °F (36.4 °C)  Pulse:  [63-78] 67  Resp:  [17-31] 31  SpO2:  [94 %-99 %] 99 %  BP: (100-112)/(58-65) 100/58     Weight: 80.3 kg (177 lb 0.5 oz)  Body mass index is 25.4 kg/m².    Physical Exam   Constitutional: He is oriented to person, place, and time. He appears cachectic. He appears ill. No distress.   HENT:   Head:  Normocephalic and atraumatic.   Eyes: Pupils are equal, round, and reactive to light. Right eye exhibits no discharge. Left eye exhibits no discharge. Scleral icterus is present.   Neck: Normal range of motion. Neck supple.   Cardiovascular: Normal rate, regular rhythm and normal heart sounds.    Pulses:       Dorsalis pedis pulses are 1+ on the right side, and 1+ on the left side.        Posterior tibial pulses are 1+ on the right side, and 1+ on the left side.   Pulmonary/Chest: Effort normal and breath sounds normal. No respiratory distress. He has no wheezes. He has no rales. He exhibits no tenderness.   SOB/tires easily with talking   Abdominal: Soft. Bowel sounds are normal. He exhibits distension. He exhibits no mass. There is tenderness. There is no rebound and no guarding. No hernia.   Genitourinary:   Genitourinary Comments: deferred   Musculoskeletal: Normal range of motion. He exhibits edema.   Neurological: He is alert and oriented to person, place, and time.   Skin: Skin is warm and dry. He is not diaphoretic.   jaundice   Psychiatric: He has a normal mood and affect. His behavior is normal.         CRANIAL NERVES     CN III, IV, VI   Pupils are equal, round, and reactive to light.       Significant Labs:   Recent Lab Results       12/09/17  1411 12/09/17  1144 12/09/17  1055      Albumin   2.3(L)     Alkaline Phosphatase   358(H)     ALT   160(H)     Ammonia  21      Anion Gap   15     AST   334(H)     Baso #   0.02     Basophil%   0.2     Total Bilirubin   34.9  Comment:  For infants and newborns, interpretation of results should be based  on gestational age, weight and in agreement with clinical  observations.  Premature Infant recommended reference ranges:  Up to 24 hours.............<8.0 mg/dL  Up to 48 hours............<12.0 mg/dL  3-5 days..................<15.0 mg/dL  6-29 days.................<15.0 mg/dL  (H)     BNP   111  Comment:  Values of less than 100 pg/ml are consistent with non-CHF  populations.(H)     BUN, Bld   73(H)     Calcium   9.5     Chloride   98     CO2   23     Creatinine   3.3(H)     Differential Method   Automated     eGFR if    22(A)     eGFR if non    19  Comment:  Calculation used to obtain the estimated glomerular filtration  rate (eGFR) is the CKD-EPI equation.   (A)     Eos #   0.1     Eosinophil%   0.6     Glucose   74     Gran #   8.8(H)     Gran%   78.5(H)     Hematocrit   35.7(L)     Hemoglobin   12.6(L)     Coumadin Monitoring INR  1.7  Comment:  Coumadin Therapy:  2.0 - 3.0 for INR for all indicators except mechanical heart valves  and antiphospholipid syndromes which should use 2.5 - 3.5.  (H)      Lymph #   0.9(L)     Lymph%   8.0(L)     MCH   29.5     MCHC   35.3     MCV   84     Mono #   1.4(H)     Mono%   12.7     MPV   9.8     Platelets   267     Potassium   4.6     Total Protein   7.1     Protime  17.3(H)      RBC   4.27(L)     RDW   19.6(H)     Sodium   136     Troponin I 0.016  Comment:  The reference interval for Troponin I represents the 99th percentile   cutoff   for our facility and is consistent with 3rd generation assay   performance.    0.013  Comment:  The reference interval for Troponin I represents the 99th percentile   cutoff   for our facility and is consistent with 3rd generation assay   performance.       WBC   11.16           Significant Imaging:   Imaging Results          US Abdomen Pelvis Doppler Study Limited (Final result)  Result time 12/09/17 16:58:52    Final result by Charissa Cai MD (12/09/17 16:58:52)                 Impression:     Cirrhotic appearing liver with masses as described on previous studies.  Ascites.  Gallbladder wall thickening with sludge and small stones.  The main portal vein appears thrombosed with collateral flow.      Electronically signed by: CHARISSA CAI MD  Date:     12/09/17  Time:    16:58              Narrative:    Exam: Abdominal Doppler.    History: Cirrhosis.  Splenomegaly.   Hepatocellular carcinoma status post Y90    Findings: The liver is lobular and diffusely heterogeneous with several masses measuring up to 6.3 x 5.8 x 6.3 cm in diameter.  There is a moderate amount of perihepatic ascites.  The gallbladder wall is thickened measuring 4.2 mm with some sludge and several tiny stones.  Common bile duct is not identified.  There appears to be thrombus in the main portal vein with collateral flow.                             X-Ray Chest AP Portable (Final result)  Result time 12/09/17 11:16:44    Final result by Charissa Horton Jr., MD (12/09/17 11:16:44)                 Impression:     No acute cardiopulmonary disease.      Electronically signed by: CHARISSA HORTON  Date:     12/09/17  Time:    11:16              Narrative:    Exam: Portable chest radiograph    History:    Shortness of breath    Comparison:/27/2017.    Findings: Expiratory study accentuates lung markings.  Discoid atelectasis in the left midlung.  No consolidation or effusion.  The cardiac silhouette and mediastinum are within normal limits.  No pneumothorax.                            Assessment/Plan:     * SHANNAN (acute kidney injury)    - likely due to volume depletion secondary to poor intake  - IV NS 500ml given in Ed  - continue IVF  - Daily BMP            Serum total bilirubin elevated    -Secondary to worsening HCC  -continue to monitor          Cirrhosis    -alcoholic cirrhosis with advanced HCC  -supportive care  -monitor          Jaundice    -end stage HCC  -ammonia 21  -continue to montior          HCC (hepatocellular carcinoma)    -Patient last seen by Dr. Nolan on 12/4/17 and was found to have progression of HCC with tumor thrombus despite treatment with Y-90 and no longer candidate for further locoregional therapy at this time. Refer to oncology for palliative treatment only  -//Tbil 358/ ammonia 21  -supportive care at this time,  discussed palliative care/DNR status with patient and  family, awaiting decision.            VTE Risk Mitigation     None             Jesse Mcdonough DNP, ACNP-Bc, CCRN  Department of Hospital Medicine   [facility]

## 2017-12-10 NOTE — PLAN OF CARE
D/C to [facility]     12/10/17 1109   Final Note   Assessment Type Final Discharge Note   [facility]   [facility] Follow Up  Appt(s) scheduled? No   Discharge plans and expectations educations in teach back method with documentation complete? No   Right Care Referral Info   Post Acute Recommendation Other  (Hospice)

## 2017-12-10 NOTE — SUBJECTIVE & OBJECTIVE
Past Medical History:   Diagnosis Date    Anemia     Bone spur of foot     patient bone spurs removed    Cirrhosis     Fracture     Right tibia/fibula    Gallstones     GERD (gastroesophageal reflux disease)     Hernia     patient reports 2 herina in the groin area    Personal history of kidney stones        Past Surgical History:   Procedure Laterality Date    TONSILLECTOMY         Review of patient's allergies indicates:  No Known Allergies  Family History     Problem Relation (Age of Onset)    Cancer Mother    Hypertension Father    Stroke Father        Social History Main Topics    Smoking status: Current Every Day Smoker     Packs/day: 1.00     Years: 45.00     Types: Cigarettes    Smokeless tobacco: Never Used      Comment: Patient was enrolled in the smoking cessation program at  [facility].    Alcohol use No      Comment: stopped drinking 15 years ago    Drug use: No      Comment: stopped 15 years ago ( Cocaine and marijuana))    Sexual activity: Not on file     Review of Systems   Unable to perform ROS: Other     Objective:     Vital Signs (Most Recent):  Temp: 98.3 °F (36.8 °C) (12/10/17 1120)  Pulse: 63 (12/10/17 1205)  Resp: 15 (12/10/17 1205)  BP: (!) 98/47 (12/10/17 1205)  SpO2: 96 % (12/10/17 1205) Vital Signs (24h Range):  Temp:  [97 °F (36.1 °C)-98.3 °F (36.8 °C)] 98.3 °F (36.8 °C)  Pulse:  [61-74] 63  Resp:  [12-34] 15  SpO2:  [93 %-99 %] 96 %  BP: ()/(45-71) 98/47     Weight: 81 kg (178 lb 9.2 oz) (12/09/17 2200)  Body mass index is 25.56 kg/m².    No intake or output data in the 24 hours ending 12/10/17 1305    Lines/Drains/Airways     Peripheral Intravenous Line                 Peripheral IV - Single Lumen 12/09/17 1056 Right Antecubital 1 day                Physical Exam   Nursing note and vitals reviewed.    We did not perform a physical exam. Patient will be going for Hospice care.     Significant Labs:  CBC:   Recent Labs  Lab 12/09/17  1055   WBC 11.16   HGB  12.6*   HCT 35.7*        Liver Function Test:   Recent Labs  Lab 12/09/17  1055   *   *   ALKPHOS 358*   BILITOT 34.9*   PROT 7.1   ALBUMIN 2.3*       Significant Imaging:  Imaging results within the past 24 hours have been reviewed.

## 2019-11-08 NOTE — PROCEDURES
"Radiology Post-Procedure Note    Pre Op Diagnosis: Hepatocellular carcinoma    Post Op Diagnosis: Same    Procedure: Yttrium treatment    Procedure performed by: Michael Magallanes MD    Written Informed Consent Obtained: Yes    Specimen Removed: No    Estimated Blood Loss: Minimal    Findings: The celiac artery was accessed with a C2 catheter, through a right femoral artery sheath. A microcatheter was advanced into a branch in the right hepatic lobe, feeding the previously identified hepatic mass. Yttrium was then administered. Please see imaging notes for further details.    The catheter was then removed. The sheath was removed and the access site was closed using an exoseal and manual compression.      Patient tolerated procedure well.    Cuauhtemoc Gasca MD ([facility])  Radiology PGY-3  [phone]    "
done

## 2021-07-16 NOTE — TELEPHONE ENCOUNTER
Addended by: JORGE MATHEW on: 7/16/2021 11:33 AM     Modules accepted: Orders     Spoke with patient via telephone. Confirmed with Dr. Magallanes no further locoregional treatment options at this time. Patient tells me he is scheduling an appointment with oncology closer to home.

## 2022-06-03 NOTE — TELEPHONE ENCOUNTER
----- Message from Tori Lindsay sent at 4/21/2017 11:19 AM CDT -----  Contact: patient  Received a call stating that his appt on 04/26 with nuclear was canceled and he want to know for sure if it is please call   
MA spoke to pt and let him know that his appts had not been cancelled. Pt gave verbal understanding. Mailed appt reminders at his request. EMS  
English